# Patient Record
Sex: FEMALE | Race: WHITE | NOT HISPANIC OR LATINO | Employment: FULL TIME | URBAN - METROPOLITAN AREA
[De-identification: names, ages, dates, MRNs, and addresses within clinical notes are randomized per-mention and may not be internally consistent; named-entity substitution may affect disease eponyms.]

---

## 2023-05-29 ENCOUNTER — OUTSIDE PLACE OF SERVICE (OUTPATIENT)
Dept: GASTROENTEROLOGY | Facility: CLINIC | Age: 48
End: 2023-05-29
Payer: COMMERCIAL

## 2023-05-29 ENCOUNTER — OUTSIDE PLACE OF SERVICE (OUTPATIENT)
Dept: GASTROENTEROLOGY | Facility: CLINIC | Age: 48
End: 2023-05-29

## 2023-05-29 PROCEDURE — 43255 PR EGD, FLEX, W/CTRL BLEED, ANY METHOD: ICD-10-PCS | Mod: ,,, | Performed by: INTERNAL MEDICINE

## 2023-05-29 PROCEDURE — 99222 1ST HOSP IP/OBS MODERATE 55: CPT | Mod: 25,,, | Performed by: INTERNAL MEDICINE

## 2023-05-29 PROCEDURE — 99222 PR INITIAL HOSPITAL CARE,LEVL II: ICD-10-PCS | Mod: 25,,, | Performed by: INTERNAL MEDICINE

## 2023-05-29 PROCEDURE — 43255 EGD CONTROL BLEEDING ANY: CPT | Mod: ,,, | Performed by: INTERNAL MEDICINE

## 2023-05-30 ENCOUNTER — OUTSIDE PLACE OF SERVICE (OUTPATIENT)
Dept: GASTROENTEROLOGY | Facility: CLINIC | Age: 48
End: 2023-05-30
Payer: COMMERCIAL

## 2023-05-30 PROCEDURE — 43235 EGD DIAGNOSTIC BRUSH WASH: CPT | Mod: ,,, | Performed by: INTERNAL MEDICINE

## 2023-05-30 PROCEDURE — 43235 PR EGD, FLEX, DIAGNOSTIC: ICD-10-PCS | Mod: ,,, | Performed by: INTERNAL MEDICINE

## 2023-05-31 ENCOUNTER — TELEPHONE (OUTPATIENT)
Dept: GASTROENTEROLOGY | Facility: CLINIC | Age: 48
End: 2023-05-31
Payer: COMMERCIAL

## 2023-05-31 NOTE — TELEPHONE ENCOUNTER
----- Message from Lyly Chiang sent at 5/31/2023 10:22 AM CDT -----  Contact: paul from [facility]  Paul tamayo [facility] is needing a hospital f/u for pt. Please call pt st [phone] . Pt needs a 3-4 week f/u

## 2023-06-02 ENCOUNTER — TELEPHONE (OUTPATIENT)
Dept: GASTROENTEROLOGY | Facility: CLINIC | Age: 48
End: 2023-06-02
Payer: COMMERCIAL

## 2023-06-02 DIAGNOSIS — K28.4 GASTROINTESTINAL HEMORRHAGE ASSOCIATED WITH GASTROJEJUNAL ULCER: Primary | ICD-10-CM

## 2023-06-02 NOTE — PROGRESS NOTES
Clinic Note    Reason for visit:  The primary encounter diagnosis was Anastomotic ulcer S/P gastric bypass. Diagnoses of Anemia, unspecified type and Encounter for screening colonoscopy were also pertinent to this visit.    PCP: Nubia Vasquez       HPI:  This is a 47 y.o. female who was seen as inpatient on 5/29/2023 when she had acute onset of hematemesis. 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 She was evaluated in the [facility] ER and found to have hgb of 8.9 with normal MCV. Ferritin was 4.3. She had been taking Voltaren for a few weeks due to a broken left ankle. She has h/o gastric bypass and had multiple ulcers at the gastroenteric anastomosis. She states she has always been anemic. Denies any knowledge of iron deficiency. She is on pantoprazole 40mg BID. She has stopped Voltaren since hospital admission. Denies any heartburn/reflux, melena, N/V. Having BM. Denies rectal bleeding, weight loss, F/H CRC.     She does not recall most of the events from her hospitalization.  They were reviewed with her.  She is discontinued the nicotine portion of her vaping and working on discontinuing it completely.  She will avoid NSAIDs completely.    6/5/2023 H/H 9L/28L MCV 88 CMP WNL except Ca 8.3  Labs done 6/2/2023 and was told her hemoglobin was 8.6    She is planned for surgery with Dr. Guillory for her foot pending clearance from me.    EGD 5/30/2023: In proximal stomach there was small HH and what appeared to be 2 sutures near the gastric fundus vs cardia for unclear reason. Dilated gastric pouch and angulated gastroenteric anastomosis. Gastroenteric anastomotic ulcers more distal without active bleeding. Continue panto 40 BID for at least 2 months. Repeat EGD in few months.     EGD 5/29/2023:  Large amount of blood upon entering the gastric lumen.  Gastric pouch moderately to severely dilated.  Gastroenteric anastomotic ulcers with adherent clot that was removed with active bleeding s/p epinephrine injection and  single Endo Clip placement. Rec to keep patient intubated and sedated overnight and repeat EGD in the morning. Continue panto IV.    CT AP IV 5/29/2023: gastric bypass anatomy, mild splenomegaly at 13.1 cm, no GB, no uterus, air-fluid levels in small bowel, moderate stool from cecum to transverse colon, postop L4-L5 fusion.     Review of Systems   Constitutional:  Negative for fatigue, fever and unexpected weight change.   HENT:  Negative for mouth sores, postnasal drip, sore throat and trouble swallowing.    Eyes:  Negative for pain, discharge and eye dryness.   Respiratory:  Negative for apnea, cough, choking, chest tightness, shortness of breath and wheezing.    Cardiovascular:  Negative for chest pain, palpitations and leg swelling.   Gastrointestinal:  Negative for abdominal distention, abdominal pain, anal bleeding, blood in stool, change in bowel habit, constipation, diarrhea, nausea, rectal pain, vomiting, reflux, fecal incontinence and change in bowel habit.   Genitourinary:  Negative for bladder incontinence, dysuria and hematuria.   Musculoskeletal:  Negative for arthralgias, back pain and joint swelling.   Integumentary:  Negative for color change and rash.   Allergic/Immunologic: Negative for environmental allergies and food allergies.   Neurological:  Negative for seizures and headaches.   Hematological:  Negative for adenopathy. Does not bruise/bleed easily.      Past Medical History:   Diagnosis Date    Hypothyroidism, unspecified     Systemic lupus erythematosus, organ or system involvement unspecified      Past Surgical History:   Procedure Laterality Date    BACK SURGERY      CHOLECYSTECTOMY      GASTRIC BYPASS  2017    HYSTERECTOMY      THYROIDECTOMY       Family History   Problem Relation Age of Onset    No Known Problems Mother     No Known Problems Father     Breast cancer Maternal Grandmother      Social History     Tobacco Use    Smoking status: Never    Smokeless tobacco: Never   Substance  "Use Topics    Alcohol use: Yes     Comment: socially    Drug use: Never     Review of patient's allergies indicates:  No Known Allergies     Medication List with Changes/Refills   New Medications    SOD SULF-POT CHLORIDE-MAG SULF (SUTAB) 1.479-0.188- 0.225 GRAM TABLET    Take 12 tablets by mouth once daily. Take according to package instructions with indicated amount of water. No breakfast day before test. May substitute with Suprep, Clenpiq, Plenvu, Moviprep or GoLytely based on Rx plan and patient preference.   Current Medications    LEVOTHYROXINE (SYNTHROID) 200 MCG TABLET    Take 250 mcg by mouth before breakfast. Every other day    PANTOPRAZOLE (PROTONIX) 40 MG TABLET        ZOLPIDEM (AMBIEN) 10 MG TAB    Take 5 mg by mouth nightly as needed.        Vital Signs:  /67   Pulse 69   Ht 5' 9" (1.753 m)   Wt 63.5 kg (140 lb)   SpO2 99%   BMI 20.67 kg/m²         Physical Exam  Vitals reviewed.   Constitutional:       General: She is awake. She is not in acute distress.     Appearance: Normal appearance. She is well-developed. She is not ill-appearing, toxic-appearing or diaphoretic.   HENT:      Head: Normocephalic and atraumatic.      Nose: Nose normal.      Mouth/Throat:      Mouth: Mucous membranes are moist.      Pharynx: Oropharynx is clear. No oropharyngeal exudate or posterior oropharyngeal erythema.   Eyes:      General: Lids are normal. Gaze aligned appropriately. No scleral icterus.        Right eye: No discharge.         Left eye: No discharge.      Conjunctiva/sclera: Conjunctivae normal.   Neck:      Trachea: Trachea normal.   Cardiovascular:      Rate and Rhythm: Normal rate and regular rhythm.      Pulses:           Radial pulses are 2+ on the right side and 2+ on the left side.   Pulmonary:      Effort: Pulmonary effort is normal. No respiratory distress.      Breath sounds: No stridor. No wheezing.   Chest:      Chest wall: No tenderness.   Abdominal:      General: Bowel sounds are normal. " There is no distension.      Palpations: Abdomen is soft. There is no fluid wave, hepatomegaly or mass.      Tenderness: There is no abdominal tenderness. There is no guarding or rebound.   Musculoskeletal:         General: No tenderness or deformity.      Cervical back: Full passive range of motion without pain and neck supple. No tenderness.      Right lower leg: No edema.      Left lower leg: No edema.      Comments: Left foot boot   Lymphadenopathy:      Cervical: No cervical adenopathy.   Skin:     General: Skin is warm and dry.      Capillary Refill: Capillary refill takes less than 2 seconds.      Coloration: Skin is not cyanotic, jaundiced or pale.   Neurological:      General: No focal deficit present.      Mental Status: She is alert and oriented to person, place, and time.      Motor: No tremor.   Psychiatric:         Attention and Perception: Attention normal.         Mood and Affect: Mood and affect normal.         Speech: Speech normal.         Behavior: Behavior normal. Behavior is cooperative.          All of the data above and below has been reviewed by myself and any further interpretations will be reflected in the assessment and plan.   The data includes review of external notes, and independent interpretation of lab results, procedures, x-rays, and imaging reports.      Assessment:  Anastomotic ulcer S/P gastric bypass  -     Cancel: Ambulatory Referral to External Surgery  -     Ambulatory Referral to External Surgery    Anemia, unspecified type  -     Cancel: Ambulatory Referral to External Surgery  -     Ambulatory Referral to External Surgery    Encounter for screening colonoscopy  -     Cancel: Ambulatory Referral to External Surgery  -     Ambulatory Referral to External Surgery    Other orders  -     sod sulf-pot chloride-mag sulf (SUTAB) 1.479-0.188- 0.225 gram tablet; Take 12 tablets by mouth once daily. Take according to package instructions with indicated amount of water. No breakfast  day before test. May substitute with Suprep, Clenpiq, Plenvu, Moviprep or GoLytely based on Rx plan and patient preference.  Dispense: 24 tablet; Refill: 0    Anastomic ulcer- likely NSAID induced. Continue with pantoprazole 40mg BID for full 2 months and will repeat EGD for eval of ulcer  Anemia- due to blood loss/hematochezia from ulcer. She does have hx of chronic anemia but has not been evaluated for iron deficiency.  CRC screening- due for screening- will complete at 2 month reevaluation of ulcer.      The patient is requesting surgical clearance for her left ankle procedure with podiatry.  I recommend she postpone this podiatry procedure at least 2 weeks given her recent massive GI bleed.  If she remains asymptomatic at this 2 week arnol, then she may proceed from a GI standpoint for her podiatry procedure.  She will continue the pantoprazole twice a day until her next upper endoscopy.  She is asymptomatic from her current anemic state.  Although she told me she was not anemic previously she now reports her hemoglobin is around 10 at baseline.  She should avoid all nonsteroidal anti-inflammatory drugs and tobacco/nicotine products.       Recommendations:  Continue with pantoprazole 40mg twice daily.  Schedule for EGD/Colonoscopy.  Avoid all anti-inflammatories.  Continue to avoid nicotine/vaping.      Risks, benefits, and alternatives of medical management, any associated procedures, and/or treatment discussed with the patient. Patient given opportunity to ask questions and voices understanding. Patient has elected to proceed with the recommended care modalities as discussed.    Follow up in about 1 year (around 6/5/2024).    Order summary:  Orders Placed This Encounter   Procedures    Ambulatory Referral to External Surgery        Instructed patient to notify my office if they have not been contacted within two weeks after any procedures, submitting any samples (biopsies, blood, stool, urine, etc.) or after  any imaging (X-ray, CT, MRI, etc.).     Evon Matthews MD    This document may have been created using a voice recognition transcribing system. Incorrect words or phrases may have been missed during proofreading. Please interpret accordingly or contact me for clarification.

## 2023-06-05 ENCOUNTER — OFFICE VISIT (OUTPATIENT)
Dept: GASTROENTEROLOGY | Facility: CLINIC | Age: 48
End: 2023-06-05
Payer: COMMERCIAL

## 2023-06-05 VITALS
OXYGEN SATURATION: 99 % | HEART RATE: 69 BPM | HEIGHT: 69 IN | SYSTOLIC BLOOD PRESSURE: 103 MMHG | WEIGHT: 140 LBS | BODY MASS INDEX: 20.73 KG/M2 | DIASTOLIC BLOOD PRESSURE: 67 MMHG

## 2023-06-05 DIAGNOSIS — K28.9 ANASTOMOTIC ULCER S/P GASTRIC BYPASS: Primary | ICD-10-CM

## 2023-06-05 DIAGNOSIS — Z12.11 ENCOUNTER FOR SCREENING COLONOSCOPY: ICD-10-CM

## 2023-06-05 DIAGNOSIS — D64.9 ANEMIA, UNSPECIFIED TYPE: ICD-10-CM

## 2023-06-05 LAB
ABS NRBC COUNT: 0 X 10 3/UL (ref 0–0.01)
ABSOLUTE BASOPHIL: 0.04 X 10 3/UL (ref 0–0.22)
ABSOLUTE EOSINOPHIL: 0.15 X 10 3/UL (ref 0.04–0.54)
ABSOLUTE IMMATURE GRAN: 0.01 X 10 3/UL (ref 0–0.04)
ABSOLUTE LYMPHOCYTE: 1.22 X 10 3/UL (ref 0.86–4.75)
ABSOLUTE MONOCYTE: 0.27 X 10 3/UL (ref 0.22–1.08)
ALBUMIN SERPL-MCNC: 4.1 G/DL (ref 3.5–5.2)
ALBUMIN/GLOB SERPL ELPH: 2.1 {RATIO} (ref 1–2.7)
ALP ISOS SERPL LEV INH-CCNC: 99 U/L (ref 35–105)
ALT (SGPT): 14 U/L (ref 0–33)
ANION GAP SERPL CALC-SCNC: 11 MMOL/L (ref 8–17)
AST SERPL-CCNC: 20 U/L (ref 0–32)
BASOPHILS NFR BLD: 1.2 % (ref 0.2–1.2)
BILIRUBIN, TOTAL: 0.23 MG/DL (ref 0–1.2)
BUN/CREAT SERPL: 12.6 (ref 6–20)
CALCIUM SERPL-MCNC: 8.3 MG/DL (ref 8.6–10.2)
CARBON DIOXIDE, CO2: 23 MMOL/L (ref 22–29)
CHLORIDE: 102 MMOL/L (ref 98–107)
CREAT SERPL-MCNC: 0.73 MG/DL (ref 0.5–0.9)
EOSINOPHIL NFR BLD: 4.7 % (ref 0.7–7)
GFR ESTIMATION: 103.29 ML/MIN/1.73M2
GLOBULIN: 2 G/DL (ref 1.5–4.5)
GLUCOSE: 90 MG/DL (ref 74–106)
HCT VFR BLD AUTO: 28.2 % (ref 37–47)
HGB BLD-MCNC: 9 G/DL (ref 12–16)
IMMATURE GRANULOCYTES: 0.3 % (ref 0–0.5)
LYMPHOCYTES NFR BLD: 37.9 % (ref 19.3–53.1)
MCH RBC QN AUTO: 28.1 PG (ref 27–32)
MCHC RBC AUTO-ENTMCNC: 31.9 G/DL (ref 32–36)
MCV RBC AUTO: 88.1 FL (ref 82–100)
MONOCYTES NFR BLD: 8.4 % (ref 4.7–12.5)
NEUTROPHILS # BLD AUTO: 1.53 X 10 3/UL (ref 2.15–7.56)
NEUTROPHILS NFR BLD: 47.5 % (ref 34–71.1)
NUCLEATED RED BLOOD CELLS: 0 /100 WBC (ref 0–0.2)
PLATELET # BLD AUTO: 218 X 10 3/UL (ref 135–400)
POTASSIUM: 3.7 MMOL/L (ref 3.5–5.1)
PROT SNV-MCNC: 6.1 G/DL (ref 6.4–8.3)
RBC # BLD AUTO: 3.2 X 10 6/UL (ref 4.2–5.4)
RDW-SD: 46.6 FL (ref 37–54)
SODIUM: 136 MMOL/L (ref 136–145)
UREA NITROGEN (BUN): 9.2 MG/DL (ref 6–20)
WBC # BLD: 3.22 X 10 3/UL (ref 4.3–10.8)

## 2023-06-05 PROCEDURE — 3008F PR BODY MASS INDEX (BMI) DOCUMENTED: ICD-10-PCS | Mod: CPTII,S$GLB,, | Performed by: INTERNAL MEDICINE

## 2023-06-05 PROCEDURE — 99215 OFFICE O/P EST HI 40 MIN: CPT | Mod: S$GLB,,, | Performed by: INTERNAL MEDICINE

## 2023-06-05 PROCEDURE — 3074F PR MOST RECENT SYSTOLIC BLOOD PRESSURE < 130 MM HG: ICD-10-PCS | Mod: CPTII,S$GLB,, | Performed by: INTERNAL MEDICINE

## 2023-06-05 PROCEDURE — 3078F PR MOST RECENT DIASTOLIC BLOOD PRESSURE < 80 MM HG: ICD-10-PCS | Mod: CPTII,S$GLB,, | Performed by: INTERNAL MEDICINE

## 2023-06-05 PROCEDURE — 1159F MED LIST DOCD IN RCRD: CPT | Mod: CPTII,S$GLB,, | Performed by: INTERNAL MEDICINE

## 2023-06-05 PROCEDURE — 1160F PR REVIEW ALL MEDS BY PRESCRIBER/CLIN PHARMACIST DOCUMENTED: ICD-10-PCS | Mod: CPTII,S$GLB,, | Performed by: INTERNAL MEDICINE

## 2023-06-05 PROCEDURE — 99215 PR OFFICE/OUTPT VISIT, EST, LEVL V, 40-54 MIN: ICD-10-PCS | Mod: S$GLB,,, | Performed by: INTERNAL MEDICINE

## 2023-06-05 PROCEDURE — 3078F DIAST BP <80 MM HG: CPT | Mod: CPTII,S$GLB,, | Performed by: INTERNAL MEDICINE

## 2023-06-05 PROCEDURE — 3008F BODY MASS INDEX DOCD: CPT | Mod: CPTII,S$GLB,, | Performed by: INTERNAL MEDICINE

## 2023-06-05 PROCEDURE — 3074F SYST BP LT 130 MM HG: CPT | Mod: CPTII,S$GLB,, | Performed by: INTERNAL MEDICINE

## 2023-06-05 PROCEDURE — 1160F RVW MEDS BY RX/DR IN RCRD: CPT | Mod: CPTII,S$GLB,, | Performed by: INTERNAL MEDICINE

## 2023-06-05 PROCEDURE — 1159F PR MEDICATION LIST DOCUMENTED IN MEDICAL RECORD: ICD-10-PCS | Mod: CPTII,S$GLB,, | Performed by: INTERNAL MEDICINE

## 2023-06-05 RX ORDER — ZOLPIDEM TARTRATE 10 MG/1
5 TABLET ORAL NIGHTLY PRN
COMMUNITY
End: 2023-06-09 | Stop reason: SDUPTHER

## 2023-06-05 RX ORDER — SOD SULF/POT CHLORIDE/MAG SULF 1.479 G
12 TABLET ORAL DAILY
Qty: 24 TABLET | Refills: 0 | Status: SHIPPED | OUTPATIENT
Start: 2023-06-05

## 2023-06-05 RX ORDER — PANTOPRAZOLE SODIUM 40 MG/1
TABLET, DELAYED RELEASE ORAL
COMMUNITY
Start: 2023-05-31

## 2023-06-05 RX ORDER — LEVOTHYROXINE SODIUM 200 UG/1
250 TABLET ORAL
COMMUNITY

## 2023-06-05 NOTE — PATIENT INSTRUCTIONS
Continue with pantoprazole 40mg twice daily.  Schedule for EGD/Colonoscopy.  Avoid all anti-inflammatories.  Continue to avoid nicotine/vaping.    Please notify my office if you have not been contacted within two weeks after any procedures, submitting any samples (biopsies, blood, stool, urine, etc.) or after any imaging (X-ray, CT, MRI, etc.).

## 2023-06-05 NOTE — LETTER
June 5, 2023        Nubia Vasquez MD  [address]  [address]  [place] [address]             [facility]  [address] [address]  Phone: [phone]  Fax: [phone]   Patient: Giovanna Arthur   MR Number: [id number]   YOB: 1975   Date of Visit: 6/5/2023       Dear Dr. Vasquez:    Thank you for referring Giovanna Arthur to me for evaluation. Attached you will find relevant portions of my assessment and plan of care.    If you have questions, please do not hesitate to call me. I look forward to following Giovanna Arthur along with you.    Sincerely,      MD WILSON Wolf DPM [facility]

## 2023-06-05 NOTE — TELEPHONE ENCOUNTER
----- Message from Aimee Mohr LPN sent at 6/1/2023  4:16 PM CDT -----  Regarding: FW: Surgical Clearance  Contact: patient    ----- Message -----  From: Humaira Batista  Sent: 6/1/2023   3:05 PM CDT  To: Byron SORIA Staff  Subject: Surgical Clearance                               Per phone call with patient she is needing to have a surgical clearance to be sent to Dr Guillory, Podiatry because she is having foot surgery and he wants to make sure that everything is ok.  Please return call at [phone] (home).    Thanks,  SJ        
I spoke with pt and she stated that she will have labs drawn before her OV today.    
LVM asking pt to call our office.    
The patient has an OV with me Monday afternoon. Ask her to have blood drawn before that OV. Orders signed.  CARMELITA  
The patient will likely need OV before clearance can be given. Dr. Matthews, when would you like patient to come in for OV?   MLC  
Self

## 2023-06-08 ENCOUNTER — OFFICE VISIT (OUTPATIENT)
Dept: PRIMARY CARE CLINIC | Facility: CLINIC | Age: 48
End: 2023-06-08
Payer: COMMERCIAL

## 2023-06-08 ENCOUNTER — PATIENT MESSAGE (OUTPATIENT)
Dept: PRIMARY CARE CLINIC | Facility: CLINIC | Age: 48
End: 2023-06-08

## 2023-06-08 VITALS — OXYGEN SATURATION: 99 % | SYSTOLIC BLOOD PRESSURE: 104 MMHG | DIASTOLIC BLOOD PRESSURE: 69 MMHG

## 2023-06-08 DIAGNOSIS — Z98.84 H/O BARIATRIC SURGERY: ICD-10-CM

## 2023-06-08 DIAGNOSIS — Z12.39 ENCOUNTER FOR SCREENING FOR MALIGNANT NEOPLASM OF BREAST, UNSPECIFIED SCREENING MODALITY: ICD-10-CM

## 2023-06-08 DIAGNOSIS — E03.9 HYPOTHYROIDISM, UNSPECIFIED TYPE: ICD-10-CM

## 2023-06-08 DIAGNOSIS — D64.9 ANEMIA, UNSPECIFIED TYPE: Primary | ICD-10-CM

## 2023-06-08 DIAGNOSIS — G47.00 INSOMNIA, UNSPECIFIED TYPE: ICD-10-CM

## 2023-06-08 PROCEDURE — 1159F PR MEDICATION LIST DOCUMENTED IN MEDICAL RECORD: ICD-10-PCS | Mod: CPTII,S$GLB,, | Performed by: INTERNAL MEDICINE

## 2023-06-08 PROCEDURE — 3074F PR MOST RECENT SYSTOLIC BLOOD PRESSURE < 130 MM HG: ICD-10-PCS | Mod: CPTII,S$GLB,, | Performed by: INTERNAL MEDICINE

## 2023-06-08 PROCEDURE — 3078F DIAST BP <80 MM HG: CPT | Mod: CPTII,S$GLB,, | Performed by: INTERNAL MEDICINE

## 2023-06-08 PROCEDURE — 3078F PR MOST RECENT DIASTOLIC BLOOD PRESSURE < 80 MM HG: ICD-10-PCS | Mod: CPTII,S$GLB,, | Performed by: INTERNAL MEDICINE

## 2023-06-08 PROCEDURE — 1159F MED LIST DOCD IN RCRD: CPT | Mod: CPTII,S$GLB,, | Performed by: INTERNAL MEDICINE

## 2023-06-08 PROCEDURE — 99204 OFFICE O/P NEW MOD 45 MIN: CPT | Mod: S$GLB,,, | Performed by: INTERNAL MEDICINE

## 2023-06-08 PROCEDURE — 3074F SYST BP LT 130 MM HG: CPT | Mod: CPTII,S$GLB,, | Performed by: INTERNAL MEDICINE

## 2023-06-08 PROCEDURE — 99204 PR OFFICE/OUTPT VISIT, NEW, LEVL IV, 45-59 MIN: ICD-10-PCS | Mod: S$GLB,,, | Performed by: INTERNAL MEDICINE

## 2023-06-08 NOTE — PROGRESS NOTES
Subjective:      Patient ID: Giovanna Arthur is a 47 y.o. female.    Chief Complaint: Hospital Follow Up (2 nights at [facility] 05/29/23 upper GI bleed. ) and Pre-op Exam (Dr Sam- ankle revision on the right. He has done all the work up but the GI bleed halted the 1st sx date. )    HPI    Past Medical History:   Diagnosis Date    Hypothyroidism, unspecified     Systemic lupus erythematosus, organ or system involvement unspecified        Past Surgical History:   Procedure Laterality Date    BACK SURGERY      CHOLECYSTECTOMY      GASTRIC BYPASS  2017    HYSTERECTOMY      THYROIDECTOMY          This is a 47 y.o. female who was seen as inpatient on 5/29/2023 when she had acute onset of hematemesis.    She had been taking Voltaren for a few weeks due to a broken left ankle. She has h/o gastric bypass and had multiple ulcers at the gastroenteric anastomosis. She states she has always been anemic.      6/5/2023 H/H 9L/28L MCV 88 CMP WNL except Ca 8.3  Labs done 6/2/2023 and was told her hemoglobin was 8.6     She is planned for surgery with Dr. Guillory for her foot pending clearance from Dr Matthews     EGD 5/30/2023: In proximal stomach there was small HH and what appeared to be 2 sutures near the gastric fundus vs cardia for unclear reason. Dilated gastric pouch and angulated gastroenteric anastomosis. Gastroenteric anastomotic ulcers more distal without active bleeding. Continue panto 40 BID for at least 2 months. Repeat EGD in few months.     She also states she was diagnosed with lupus, has seen Dr Rachel a year ago, was told he wasn't seeing anything autoimmune. Was on plaquenil previously as well as lyrica and a muscle relaxer. She is only on levothyroxine and states she gets her ambien from [place]    Review of Systems   Constitutional:  Negative for chills and fever.   HENT:  Negative for hearing loss.    Eyes:  Negative for blurred vision.   Respiratory:  Negative for cough, shortness of breath and wheezing.   "  Cardiovascular:  Negative for chest pain, palpitations and leg swelling.   Gastrointestinal:  Negative for abdominal pain, blood in stool, constipation, diarrhea, melena, nausea and vomiting.   Genitourinary:  Negative for dysuria, frequency and urgency.   Musculoskeletal:  Negative for falls.   Skin:  Negative for rash.   Neurological:  Negative for dizziness and headaches.   Endo/Heme/Allergies:  Does not bruise/bleed easily.   Psychiatric/Behavioral:  Negative for depression. The patient is not nervous/anxious.    Objective:     Physical Exam  Vitals reviewed.   Constitutional:       Appearance: Normal appearance.   HENT:      Head: Normocephalic.      Mouth/Throat:      Mouth: Mucous membranes are moist.      Pharynx: Oropharynx is clear.   Eyes:      Extraocular Movements: Extraocular movements intact.      Conjunctiva/sclera: Conjunctivae normal.      Pupils: Pupils are equal, round, and reactive to light.   Cardiovascular:      Rate and Rhythm: Normal rate and regular rhythm.   Pulmonary:      Effort: Pulmonary effort is normal.      Breath sounds: Normal breath sounds.   Abdominal:      General: Bowel sounds are normal.   Musculoskeletal:      Right lower leg: No edema.      Left lower leg: No edema.   Skin:     General: Skin is warm.      Capillary Refill: Capillary refill takes less than 2 seconds.   Neurological:      General: No focal deficit present.      Mental Status: She is alert.   Psychiatric:         Mood and Affect: Mood normal.     /69 (BP Location: Left arm, Patient Position: Sitting, BP Method: Medium (Automatic))   Pulse (P) 71   Ht (P) 5' 9" (1.753 m)   Wt (P) 63.5 kg (140 lb)   SpO2 99%   BMI (P) 20.67 kg/m²     Assessment:       ICD-10-CM ICD-9-CM   1. Anemia, unspecified type  D64.9 285.9   2. Hypothyroidism, unspecified type  E03.9 244.9   3. Encounter for screening for malignant neoplasm of breast, unspecified screening modality  Z12.39 V76.10   4. H/O bariatric surgery  " Z98.84 V45.86   5. Insomnia, unspecified type  G47.00 780.52       Plan:     Medication List with Changes/Refills   New Medications    FERROUS SULFATE (IRON) 325 MG (65 MG IRON) TAB TABLET    Take 1 tablet (325 mg total) by mouth 3 (three) times daily.   Current Medications    LEVOTHYROXINE (SYNTHROID) 200 MCG TABLET    Take 250 mcg by mouth before breakfast. Every other day    PANTOPRAZOLE (PROTONIX) 40 MG TABLET        SOD SULF-POT CHLORIDE-MAG SULF (SUTAB) 1.479-0.188- 0.225 GRAM TABLET    Take 12 tablets by mouth once daily. Take according to package instructions with indicated amount of water. No breakfast day before test. May substitute with Suprep, Clenpiq, Plenvu, Moviprep or GoLytely based on Rx plan and patient preference.   Changed and/or Refilled Medications    Modified Medication Previous Medication    ZOLPIDEM (AMBIEN) 10 MG TAB zolpidem (AMBIEN) 10 mg Tab       Take 1 tablet (10 mg total) by mouth nightly as needed (insomnia).    Take 5 mg by mouth nightly as needed.        1. Anemia, unspecified type  -     Iron, TIBC and Ferritin Panel; Future; Expected date: 06/08/2023  -     ferrous sulfate (IRON) 325 mg (65 mg iron) Tab tablet; Take 1 tablet (325 mg total) by mouth 3 (three) times daily.  Dispense: 90 tablet; Refill: 3    2. Hypothyroidism, unspecified type  -     TSH; Future; Expected date: 06/08/2023  -     T4, Free; Future; Expected date: 06/08/2023    3. Encounter for screening for malignant neoplasm of breast, unspecified screening modality  -     Mammo Digital Screening Bilat; Future; Expected date: 06/08/2023    4. H/O bariatric surgery  -     Vitamin B12; Future; Expected date: 06/08/2023    5. Insomnia, unspecified type  -     zolpidem (AMBIEN) 10 mg Tab; Take 1 tablet (10 mg total) by mouth nightly as needed (insomnia).  Dispense: 30 tablet; Refill: 0    Other orders  -     TSH+Free T4         Per Dr Matthews note patient can have surgery if asymptomatic for about 2 weeks. Last hgb was 9.  She states she has not had iron labs done before and needs thyroid labs as well    Future Appointments   Date Time Provider Department Center   8/29/2023  6:30 AM [facility]  [place]   12/8/2023  8:20 AM Nubai Vasquez MD [facility]  [place]   6/5/2024 12:15 PM Evon Matthews MD [facility]  [place]     Patient is low risk for low risk surgery

## 2023-06-09 ENCOUNTER — TELEPHONE (OUTPATIENT)
Dept: PRIMARY CARE CLINIC | Facility: CLINIC | Age: 48
End: 2023-06-09
Payer: COMMERCIAL

## 2023-06-09 ENCOUNTER — TELEPHONE (OUTPATIENT)
Dept: GASTROENTEROLOGY | Facility: CLINIC | Age: 48
End: 2023-06-09

## 2023-06-09 ENCOUNTER — PATIENT MESSAGE (OUTPATIENT)
Dept: PRIMARY CARE CLINIC | Facility: CLINIC | Age: 48
End: 2023-06-09
Payer: COMMERCIAL

## 2023-06-09 LAB
% SATURATION: 3 % (ref 20–50)
FERRITIN SERPL-MCNC: 2.7 NG/ML (ref 8–252)
IRON: 11 UG/DL (ref 50–170)
T4 FREE SP9 P CHAL SERPL-SCNC: 0.41 NG/DL (ref 0.76–1.46)
TOTAL IRON BINDING CAPACITY: 367 UG/DL (ref 250–450)
TSH SERPL DL<=0.005 MIU/L-ACNC: 6.09 UIU/ML (ref 0.36–3.74)
VITAMIN B12: 945 PG/ML (ref 193–986)

## 2023-06-09 RX ORDER — ZOLPIDEM TARTRATE 10 MG/1
10 TABLET ORAL NIGHTLY PRN
Qty: 30 TABLET | Refills: 0 | Status: SHIPPED | OUTPATIENT
Start: 2023-06-09

## 2023-06-09 NOTE — TELEPHONE ENCOUNTER
----- Message from Humaira Batista sent at 6/9/2023  3:47 PM CDT -----  Regarding: Lab Reports  Contact: Dr Kahlil Covington  Per phone call with Nadya, she is calling from [facility] and she is needing the lab notes from 06/05/2023 and she do not have a copy of the lab and anesthesia is requiring a copy for the surgery.  Please return call at [phone] and fax's [phone].  Her surgery date is Rowena 15,2023 and Anesthesia needs it by the 13th of June to review it.    Thanks,  SJ

## 2023-06-09 NOTE — TELEPHONE ENCOUNTER
----- Message from Sherly Villarreal sent at 6/9/2023  8:36 AM CDT -----  Contact: Zachary ([facility])  Nadya called to consult with nurse or staff regarding a surgery clearance. She states they are needing the HNP, heart and lung assessment and office notes faxed over to them. Nadya left a contact number for the office, [phone] and also a fax number of [phone]. Thanks/MR

## 2023-06-12 RX ORDER — FERROUS SULFATE 325(65) MG
325 TABLET ORAL 3 TIMES DAILY
Qty: 90 TABLET | Refills: 3 | Status: SHIPPED | OUTPATIENT
Start: 2023-06-12

## 2023-06-13 ENCOUNTER — PATIENT MESSAGE (OUTPATIENT)
Dept: PRIMARY CARE CLINIC | Facility: CLINIC | Age: 48
End: 2023-06-13
Payer: COMMERCIAL

## 2023-06-13 ENCOUNTER — TELEPHONE (OUTPATIENT)
Dept: PRIMARY CARE CLINIC | Facility: CLINIC | Age: 48
End: 2023-06-13
Payer: COMMERCIAL

## 2023-06-13 NOTE — TELEPHONE ENCOUNTER
MULTIPLE MESSAGES. I was out on yesterday. Everything has been completed this am  and faxed. Pt notified through her [facility].     ----- Message from Hortencia Burris LPN sent at 6/12/2023  4:38 PM CDT -----  Regarding: FW: Surgical Clearance  Contact: patient  Did you receive a surgical clearance letter for this patient?     ----- Message -----  From: Humaira Batista  Sent: 6/12/2023   2:27 PM CDT  To: Pedro Delacruz Staff  Subject: Surgical Clearance                               Per phone call with patient, she stated that she is schedule to have surgery on Thursday 06/06/15/2023 with a podiatrist and she is needing the surgical clearance to be faxed to Dr Guillory and the office has not received it.  There office faxed over information twice.  Please return call at [phone] (home).    Thanks,  ELKE

## 2023-06-14 ENCOUNTER — PATIENT MESSAGE (OUTPATIENT)
Dept: PRIMARY CARE CLINIC | Facility: CLINIC | Age: 48
End: 2023-06-14
Payer: COMMERCIAL

## 2023-08-24 ENCOUNTER — TELEPHONE (OUTPATIENT)
Dept: GASTROENTEROLOGY | Facility: CLINIC | Age: 48
End: 2023-08-24
Payer: COMMERCIAL

## 2023-08-24 VITALS — HEIGHT: 69 IN | WEIGHT: 140 LBS | BODY MASS INDEX: 20.73 KG/M2

## 2023-08-24 DIAGNOSIS — D64.9 ANEMIA, UNSPECIFIED TYPE: ICD-10-CM

## 2023-08-24 DIAGNOSIS — Z12.11 ENCOUNTER FOR SCREENING COLONOSCOPY: ICD-10-CM

## 2023-08-24 DIAGNOSIS — K28.9 ANASTOMOTIC ULCER S/P GASTRIC BYPASS: Primary | ICD-10-CM

## 2023-08-24 NOTE — TELEPHONE ENCOUNTER
"[facility]  [address] [address]  Phone: [phone]  Fax: [phone]    History & Physical         Provider: Dr. Evon Matthews    Patient Name: Giovanna ARIAS (age):1975  47 y.o.           Gender: female   Phone: [phone]     Referring Physician: Nubia Vasquez     Vital Signs:   Height - 5' 9"  Weight - 140 lb  BMI -  20.67    Plan: EGD/Colonoscopy @ [facility]    Encounter Diagnoses   Name Primary?    Anastomotic ulcer S/P gastric bypass Yes    Anemia, unspecified type     Encounter for screening colonoscopy            History:      Past Medical History:   Diagnosis Date    Hypothyroidism, unspecified     Systemic lupus erythematosus, organ or system involvement unspecified       Past Surgical History:   Procedure Laterality Date    BACK SURGERY      CHOLECYSTECTOMY      GASTRIC BYPASS  2017    HYSTERECTOMY      THYROIDECTOMY        Medication List with Changes/Refills   Current Medications    FERROUS SULFATE (IRON) 325 MG (65 MG IRON) TAB TABLET    Take 1 tablet (325 mg total) by mouth 3 (three) times daily.    LEVOTHYROXINE (SYNTHROID) 200 MCG TABLET    Take 250 mcg by mouth before breakfast. Every other day    PANTOPRAZOLE (PROTONIX) 40 MG TABLET        SOD SULF-POT CHLORIDE-MAG SULF (SUTAB) 1.479-0.188- 0.225 GRAM TABLET    Take 12 tablets by mouth once daily. Take according to package instructions with indicated amount of water. No breakfast day before test. May substitute with Suprep, Clenpiq, Plenvu, Moviprep or GoLytely based on Rx plan and patient preference.    ZOLPIDEM (AMBIEN) 10 MG TAB    Take 1 tablet (10 mg total) by mouth nightly as needed (insomnia).      Review of patient's allergies indicates:  No Known Allergies   Family History   Problem Relation Age of Onset    No Known Problems Mother     No Known Problems Father     Breast cancer Maternal Grandmother     "   Social History     Tobacco Use    Smoking status: Never    Smokeless tobacco: Never   Substance Use Topics    Alcohol use: Yes     Comment: socially    Drug use: Never        Physical Examination:     General Appearance:___________________________  HEENT: _____________________________________  Abdomen:____________________________________  Heart:________________________________________  Lungs:_______________________________________  Extremities:___________________________________  Skin:_________________________________________  Endocrine:____________________________________  Genitourinary:_________________________________  Neurological:__________________________________      Patient has been evaluated immediately prior to sedation and is medically cleared for endoscopy with IVCS as an ASA class: ______      Physician Signature: _________________________       Date: ________  Time: ________

## 2023-08-28 ENCOUNTER — TELEPHONE (OUTPATIENT)
Dept: GASTROENTEROLOGY | Facility: CLINIC | Age: 48
End: 2023-08-28
Payer: COMMERCIAL

## 2023-08-28 NOTE — TELEPHONE ENCOUNTER
Returned callers call and she states she didn't have a ride and then when I let her know the r/s date she states she will make a way leave on schedule

## 2023-08-28 NOTE — TELEPHONE ENCOUNTER
----- Message from Nasreen Keenan sent at 8/28/2023 12:27 PM CDT -----  Contact: Pt  Has a procedure scheduled for tomorrow but have no transportation and needs to resched and can be reached at [phone]//thanks/dbw

## 2023-08-29 ENCOUNTER — OUTSIDE PLACE OF SERVICE (OUTPATIENT)
Dept: GASTROENTEROLOGY | Facility: CLINIC | Age: 48
End: 2023-08-29

## 2023-08-29 LAB — CRC RECOMMENDATION EXT: NORMAL

## 2023-08-29 PROCEDURE — 43239 EGD BIOPSY SINGLE/MULTIPLE: CPT | Mod: ,,, | Performed by: INTERNAL MEDICINE

## 2023-08-29 PROCEDURE — 45385 PR COLONOSCOPY,REMV LESN,SNARE: ICD-10-PCS | Mod: 33,,, | Performed by: INTERNAL MEDICINE

## 2023-08-29 PROCEDURE — 45385 COLONOSCOPY W/LESION REMOVAL: CPT | Mod: 33,,, | Performed by: INTERNAL MEDICINE

## 2023-08-29 PROCEDURE — 43239 PR EGD, FLEX, W/BIOPSY, SGL/MULTI: ICD-10-PCS | Mod: ,,, | Performed by: INTERNAL MEDICINE

## 2023-08-30 LAB — SPECIMEN TO PATHOLOGY: NORMAL

## 2023-09-04 ENCOUNTER — TELEPHONE (OUTPATIENT)
Dept: GASTROENTEROLOGY | Facility: CLINIC | Age: 48
End: 2023-09-04
Payer: COMMERCIAL

## 2023-09-05 NOTE — TELEPHONE ENCOUNTER
SBBx nl, gastric pouch Bx react w/o Hp, 1 TA, repeat colonoscopy in 5 years.     Notify patient. No signs of infection, precancerous cells or Celiac disease on the upper endoscopy biopsies.  Her colon polyp was benign. Repeat colonoscopy in 5 years.  Update in Health Maintenance section of Epic. Her iron studies from 6/2023 were still low.  I recommend she have iron infusion (two infusions) given her poor iron absorption due to her gastric bypass.  We can order if she agrees to the infusions.  She is also to decrease her panto 40 from BID to once daily.  NBP

## 2023-09-06 ENCOUNTER — TELEPHONE (OUTPATIENT)
Dept: GASTROENTEROLOGY | Facility: CLINIC | Age: 48
End: 2023-09-06
Payer: COMMERCIAL

## 2023-09-06 DIAGNOSIS — D50.9 IRON DEFICIENCY ANEMIA, UNSPECIFIED IRON DEFICIENCY ANEMIA TYPE: ICD-10-CM

## 2023-09-06 NOTE — TELEPHONE ENCOUNTER
Pt informed of results, states she would like for you to order the 2 iron infusions as suggested.  Informed pt we will send the orders and the infusion center will contact her with the date and time . Patient verbalized understanding to call the office if she does not hear from them within 2 weeks.

## 2023-09-08 PROBLEM — D50.9 IRON DEFICIENCY ANEMIA: Status: ACTIVE | Noted: 2023-09-08

## 2023-09-08 RX ORDER — HEPARIN 100 UNIT/ML
500 SYRINGE INTRAVENOUS
Status: CANCELLED | OUTPATIENT
Start: 2023-09-11

## 2023-09-08 RX ORDER — DIPHENHYDRAMINE HYDROCHLORIDE 50 MG/ML
50 INJECTION INTRAMUSCULAR; INTRAVENOUS ONCE AS NEEDED
Status: CANCELLED | OUTPATIENT
Start: 2023-09-11

## 2023-09-08 RX ORDER — EPINEPHRINE 0.3 MG/.3ML
0.3 INJECTION SUBCUTANEOUS ONCE AS NEEDED
Status: CANCELLED | OUTPATIENT
Start: 2023-09-11

## 2023-09-08 RX ORDER — SODIUM CHLORIDE 0.9 % (FLUSH) 0.9 %
10 SYRINGE (ML) INJECTION
Status: CANCELLED | OUTPATIENT
Start: 2023-09-11

## 2023-09-08 NOTE — TELEPHONE ENCOUNTER
Therapy plan created for feraheme. Will send paper order to infusion center once we receive approval. Reminder set.  KN

## 2023-09-12 ENCOUNTER — TELEPHONE (OUTPATIENT)
Dept: GASTROENTEROLOGY | Facility: CLINIC | Age: 48
End: 2023-09-12
Payer: COMMERCIAL

## 2023-09-12 DIAGNOSIS — D50.9 IRON DEFICIENCY ANEMIA, UNSPECIFIED IRON DEFICIENCY ANEMIA TYPE: Primary | ICD-10-CM

## 2023-09-12 NOTE — TELEPHONE ENCOUNTER
Received this message from JORI Rollins    per [facility] step therapy requirements for iron infusions, the patient has to try and fail the preferred Venofer prior to approving the non-preferred Feraheme.    Patient notified that they will receive 5 doses instead of 2 due to insurance restrictions. The infusion will also be done with [facility] care. Prescription for Venofer 200mg IVP x 5 days over 14 days printed.  IDA

## 2023-09-21 LAB — BCS RECOMMENDATION EXT: NORMAL

## 2023-09-25 ENCOUNTER — TELEPHONE (OUTPATIENT)
Dept: GASTROENTEROLOGY | Facility: CLINIC | Age: 48
End: 2023-09-25
Payer: COMMERCIAL

## 2023-09-25 DIAGNOSIS — D50.9 IRON DEFICIENCY ANEMIA, UNSPECIFIED IRON DEFICIENCY ANEMIA TYPE: Primary | ICD-10-CM

## 2023-09-25 NOTE — TELEPHONE ENCOUNTER
IV venofer at [facility] care denied- not medically necessary due to no h/o of kidney disease.    Called [employer] which they stated appeal had to be done in writing. Appeal faxed to [phone] stating patient with FARIHA despite oral iron. IV iron medically necessary due to h/o gastric bypass.   KN

## 2023-09-26 ENCOUNTER — DOCUMENTATION ONLY (OUTPATIENT)
Dept: GASTROENTEROLOGY | Facility: CLINIC | Age: 48
End: 2023-09-26
Payer: COMMERCIAL

## 2023-10-02 ENCOUNTER — TELEPHONE (OUTPATIENT)
Dept: GASTROENTEROLOGY | Facility: CLINIC | Age: 48
End: 2023-10-02
Payer: COMMERCIAL

## 2023-10-02 NOTE — TELEPHONE ENCOUNTER
----- Message from Claudia Beach sent at 10/2/2023 10:02 AM CDT -----  Contact: dari  [email or web address]  requesting cb/intake

## 2023-10-10 ENCOUNTER — PATIENT OUTREACH (OUTPATIENT)
Dept: ADMINISTRATIVE | Facility: HOSPITAL | Age: 48
End: 2023-10-10
Payer: COMMERCIAL

## 2023-10-10 ENCOUNTER — PATIENT MESSAGE (OUTPATIENT)
Dept: PRIMARY CARE CLINIC | Facility: CLINIC | Age: 48
End: 2023-10-10
Payer: COMMERCIAL

## 2023-10-17 NOTE — TELEPHONE ENCOUNTER
Peer to peer done. Spoke with Dr. Machado. States venofer not recommended unless CKD. Feraheme would be preferred. Explained that we tried Feraheme and per [employer]: Venofer is preferred and is on formulary. He is calling pharmacy benefits to see if we can get feraheme approved. Will call back this evening or early in the AM.  KN

## 2023-10-20 RX ORDER — FERRIC CARBOXYMALTOSE 50 MG/ML
750 INJECTION, SOLUTION INTRAVENOUS WEEKLY
Qty: 15 ML | Refills: 1 | Status: SHIPPED | OUTPATIENT
Start: 2023-10-20 | End: 2023-10-20 | Stop reason: SDUPTHER

## 2023-10-20 NOTE — TELEPHONE ENCOUNTER
Per Dr. Machado. John is able to have INFED, Injectafer, or Monoferric. Called [facility] and confirmed that they have Injectafer.     Inject Injectefar 750mg IV x 2 dose >7 days apart prescription signed and printed. Patient was aware we were working on infusion with insurance. Please notify patient send to [facility].     IDA.

## 2023-11-01 ENCOUNTER — TELEPHONE (OUTPATIENT)
Dept: GASTROENTEROLOGY | Facility: CLINIC | Age: 48
End: 2023-11-01
Payer: COMMERCIAL

## 2023-11-01 NOTE — TELEPHONE ENCOUNTER
----- Message from Mary Bee LPN sent at 10/31/2023  3:35 PM CDT -----  I spoke with Vonnie @ [facility] @ [phone]. Vonnie informed me that [facility] is continuing to deny pt's iron infusion. She wants to know if there is something else you would like to do or if you have additional documentation to provide to them.  ----- Message -----  From: Natalee Campbell  Sent: 10/31/2023  10:52 AM CDT  To: Ryan Ruiz Staff    Vonnie w/[facility]ulce [facility] calling about iron infusion being denied again and they needs additional documentation.  They can be reached at [phone] and fax number [phone].    Thanks,

## 2023-11-03 ENCOUNTER — TELEPHONE (OUTPATIENT)
Dept: GASTROENTEROLOGY | Facility: CLINIC | Age: 48
End: 2023-11-03
Payer: COMMERCIAL

## 2023-11-03 NOTE — TELEPHONE ENCOUNTER
----- Message from Mary Bee LPN sent at 10/31/2023  3:35 PM CDT -----  I spoke with Vonnie @ [facility] @ [phone]. Vonnie informed me that [facility] is continuing to deny pt's iron infusion. She wants to know if there is something else you would like to do or if you have additional documentation to provide to them.  ----- Message -----  From: Natalee Campbell  Sent: 10/31/2023  10:52 AM CDT  To: Ryan Ruiz Staff    Vonnie w/dulce [facility] calling about iron infusion being denied again and they needs additional documentation.  They can be reached at [phone] and fax number [phone].    Thanks,

## 2023-11-03 NOTE — TELEPHONE ENCOUNTER
Spoke with Vonnie at [facility]. She ran it through again injectafer was approved. Calling the patient right now to schedule.  IDA

## 2023-11-07 ENCOUNTER — OFFICE VISIT (OUTPATIENT)
Dept: URGENT CARE | Facility: CLINIC | Age: 48
End: 2023-11-07
Payer: COMMERCIAL

## 2023-11-07 VITALS
SYSTOLIC BLOOD PRESSURE: 108 MMHG | WEIGHT: 150 LBS | HEART RATE: 77 BPM | HEIGHT: 69 IN | BODY MASS INDEX: 22.22 KG/M2 | OXYGEN SATURATION: 98 % | DIASTOLIC BLOOD PRESSURE: 75 MMHG | RESPIRATION RATE: 16 BRPM | TEMPERATURE: 99 F

## 2023-11-07 DIAGNOSIS — J02.9 SORE THROAT: ICD-10-CM

## 2023-11-07 DIAGNOSIS — R09.81 NASAL CONGESTION: ICD-10-CM

## 2023-11-07 DIAGNOSIS — J10.1 INFLUENZA A: Primary | ICD-10-CM

## 2023-11-07 DIAGNOSIS — H66.002 LEFT ACUTE SUPPURATIVE OTITIS MEDIA: ICD-10-CM

## 2023-11-07 DIAGNOSIS — R50.9 FEVER, UNSPECIFIED FEVER CAUSE: ICD-10-CM

## 2023-11-07 DIAGNOSIS — R52 BODY ACHES: ICD-10-CM

## 2023-11-07 LAB
CTP QC/QA: YES
CTP QC/QA: YES
POC MOLECULAR INFLUENZA A AGN: POSITIVE
POC MOLECULAR INFLUENZA B AGN: NEGATIVE
SARS-COV-2 AG RESP QL IA.RAPID: NEGATIVE

## 2023-11-07 PROCEDURE — 99214 PR OFFICE/OUTPT VISIT, EST, LEVL IV, 30-39 MIN: ICD-10-PCS | Mod: S$GLB,,, | Performed by: NURSE PRACTITIONER

## 2023-11-07 PROCEDURE — 87502 INFLUENZA DNA AMP PROBE: CPT | Mod: QW,,, | Performed by: NURSE PRACTITIONER

## 2023-11-07 PROCEDURE — 99214 OFFICE O/P EST MOD 30 MIN: CPT | Mod: S$GLB,,, | Performed by: NURSE PRACTITIONER

## 2023-11-07 PROCEDURE — 87502 POCT INFLUENZA A/B MOLECULAR: ICD-10-PCS | Mod: QW,,, | Performed by: NURSE PRACTITIONER

## 2023-11-07 PROCEDURE — 87811 SARS CORONAVIRUS 2 ANTIGEN POCT, MANUAL READ: ICD-10-PCS | Mod: QW,S$GLB,, | Performed by: NURSE PRACTITIONER

## 2023-11-07 PROCEDURE — 87811 SARS-COV-2 COVID19 W/OPTIC: CPT | Mod: QW,S$GLB,, | Performed by: NURSE PRACTITIONER

## 2023-11-07 RX ORDER — FLUTICASONE PROPIONATE 50 MCG
1 SPRAY, SUSPENSION (ML) NASAL DAILY
Qty: 9.9 ML | Refills: 0 | Status: SHIPPED | OUTPATIENT
Start: 2023-11-07

## 2023-11-07 RX ORDER — AZELASTINE 1 MG/ML
1 SPRAY, METERED NASAL 2 TIMES DAILY
Qty: 30 ML | Refills: 0 | Status: SHIPPED | OUTPATIENT
Start: 2023-11-07 | End: 2024-11-06

## 2023-11-07 RX ORDER — OSELTAMIVIR PHOSPHATE 75 MG/1
75 CAPSULE ORAL 2 TIMES DAILY
Qty: 10 CAPSULE | Refills: 0 | Status: SHIPPED | OUTPATIENT
Start: 2023-11-07 | End: 2023-11-12

## 2023-11-07 RX ORDER — BROMPHENIRAMINE MALEATE, PSEUDOEPHEDRINE HYDROCHLORIDE, AND DEXTROMETHORPHAN HYDROBROMIDE 2; 30; 10 MG/5ML; MG/5ML; MG/5ML
10 SYRUP ORAL EVERY 4 HOURS PRN
Qty: 120 ML | Refills: 0 | Status: SHIPPED | OUTPATIENT
Start: 2023-11-07

## 2023-11-07 RX ORDER — AMOXICILLIN 500 MG/1
500 TABLET, FILM COATED ORAL EVERY 12 HOURS
Qty: 20 TABLET | Refills: 0 | Status: SHIPPED | OUTPATIENT
Start: 2023-11-07 | End: 2023-11-17

## 2023-11-07 NOTE — PROGRESS NOTES
"Subjective:      Patient ID: Giovanna Arthur is a 47 y.o. female.    Vitals:  height is 5' 9" (1.753 m) and weight is 68 kg (150 lb). Her temperature is 98.6 °F (37 °C). Her blood pressure is 108/75 and her pulse is 77. Her respiration is 16 and oxygen saturation is 98%.     Chief Complaint: Generalized Body Aches    Patient complains of fever, body aches, cough, sore throat and left ear pain that started 3 days ago. She has been exposed to both covid and the flu. She has been taking tylenol with minimal relief.     Other  This is a new problem. The current episode started in the past 7 days. The problem occurs constantly. The problem has been gradually worsening. Associated symptoms include chills, congestion, coughing, diaphoresis, fatigue, a fever, headaches, myalgias and a sore throat. Pertinent negatives include no abdominal pain, chest pain, nausea, neck pain, rash or vomiting. She has tried acetaminophen for the symptoms. The treatment provided no relief.       Constitution: Positive for chills, sweating, fatigue and fever. Negative for activity change and appetite change.   HENT:  Positive for congestion, postnasal drip, sinus pressure and sore throat.    Neck: Negative for neck pain, neck stiffness and painful lymph nodes.   Cardiovascular:  Negative for chest pain and leg swelling.   Respiratory:  Positive for cough and sputum production.    Gastrointestinal:  Negative for abdominal pain, nausea and vomiting.   Musculoskeletal:  Positive for muscle ache.   Skin:  Negative for color change, pale, rash and wound.   Neurological:  Positive for headaches. Negative for dizziness, light-headedness, disorientation and altered mental status.   Hematologic/Lymphatic: Negative for swollen lymph nodes.   Psychiatric/Behavioral:  Negative for altered mental status, disorientation and confusion.       Objective:     Physical Exam   Constitutional: She is oriented to person, place, and time.  Non-toxic appearance. She does " not appear ill. No distress.   HENT:   Head: Normocephalic and atraumatic.   Ears:   Right Ear: A middle ear effusion is present.   Left Ear: No mastoid tenderness. Tympanic membrane is erythematous and bulging. Tympanic membrane is not perforated and not retracted. A middle ear effusion is present.   Nose: Mucosal edema and rhinorrhea present. Right sinus exhibits no maxillary sinus tenderness and no frontal sinus tenderness. Left sinus exhibits no maxillary sinus tenderness and no frontal sinus tenderness.   Mouth/Throat: Uvula is midline and mucous membranes are normal. Mucous membranes are moist. No trismus in the jaw. No uvula swelling. Posterior oropharyngeal erythema and cobblestoning present. No tonsillar exudate.   Eyes: Conjunctivae are normal.   Neck: Neck supple.   Cardiovascular: Normal rate, regular rhythm, normal heart sounds and normal pulses.   Pulmonary/Chest: Effort normal and breath sounds normal.   Abdominal: Normal appearance.   Musculoskeletal: Normal range of motion.         General: Normal range of motion.   Lymphadenopathy:     She has cervical adenopathy.        Right cervical: Superficial cervical adenopathy present.        Left cervical: Superficial cervical adenopathy present.   Neurological: no focal deficit. She is alert, oriented to person, place, and time and at baseline.   Skin: Skin is warm, dry and not diaphoretic.   Psychiatric: Her behavior is normal. Mood, judgment and thought content normal.   Nursing note and vitals reviewed.      Assessment:     1. Influenza A    2. Sore throat    3. Body aches    4. Nasal congestion    5. Left acute suppurative otitis media    6. Fever, unspecified fever cause        Plan:     Office Visit on 11/07/2023   Component Date Value Ref Range Status    SARS Coronavirus 2 Antigen 11/07/2023 Negative  Negative Final     Acceptable 11/07/2023 Yes   Final    POC Molecular Influenza A Ag 11/07/2023 Positive (A)  Negative, Not Reported  Final    POC Molecular Influenza B Ag 11/07/2023 Negative  Negative, Not Reported Final     Acceptable 11/07/2023 Yes   Final         Influenza A  -     oseltamivir (TAMIFLU) 75 MG capsule; Take 1 capsule (75 mg total) by mouth 2 (two) times daily. for 5 days  Dispense: 10 capsule; Refill: 0  -     brompheniramine-pseudoeph-DM (BROMFED DM) 2-30-10 mg/5 mL Syrp; Take 10 mLs by mouth every 4 (four) hours as needed (cough/congestion).  Dispense: 120 mL; Refill: 0  -     azelastine (ASTELIN) 137 mcg (0.1 %) nasal spray; 1 spray (137 mcg total) by Nasal route 2 (two) times daily.  Dispense: 30 mL; Refill: 0  -     fluticasone propionate (FLONASE) 50 mcg/actuation nasal spray; 1 spray (50 mcg total) by Each Nostril route once daily.  Dispense: 9.9 mL; Refill: 0    Sore throat  -     SARS Coronavirus 2 Antigen, POCT Manual Read  -     POCT Influenza A/B MOLECULAR    Body aches  -     SARS Coronavirus 2 Antigen, POCT Manual Read  -     POCT Influenza A/B MOLECULAR    Nasal congestion  -     SARS Coronavirus 2 Antigen, POCT Manual Read  -     POCT Influenza A/B MOLECULAR  -     brompheniramine-pseudoeph-DM (BROMFED DM) 2-30-10 mg/5 mL Syrp; Take 10 mLs by mouth every 4 (four) hours as needed (cough/congestion).  Dispense: 120 mL; Refill: 0  -     azelastine (ASTELIN) 137 mcg (0.1 %) nasal spray; 1 spray (137 mcg total) by Nasal route 2 (two) times daily.  Dispense: 30 mL; Refill: 0  -     fluticasone propionate (FLONASE) 50 mcg/actuation nasal spray; 1 spray (50 mcg total) by Each Nostril route once daily.  Dispense: 9.9 mL; Refill: 0    Left acute suppurative otitis media  -     amoxicillin (AMOXIL) 500 MG Tab; Take 1 tablet (500 mg total) by mouth every 12 (twelve) hours. for 10 days  Dispense: 20 tablet; Refill: 0    Fever, unspecified fever cause          Medical Decision Making:   Clinical Tests:   Lab Tests: Reviewed       <> Summary of Lab: Poct influenza A+    Patient Instructions   Please follow up  with your primary care provider within 2-5 days if your signs and symptoms have not resolved or worsen.     If your condition worsens or fails to improve we recommend that you receive another evaluation at the emergency room immediately or contact your primary medical clinic to discuss your concerns.   You must understand that you have received an Urgent Care treatment only and that you may be released before all of your medical problems are known or treated. You, the patient, will arrange for follow up care as instructed.       You have tested negative for COVID on our Binax test. As a follow up the recommendation is if you have symptoms within the first 7 days to retest within 48 hours. I you have NO SYMPTONS then you should test every 48 hours two more times.      Please take antibiotics to completion.  Alternate Tylenol/motrin as needed for fever/body aches.  Rest  Increase fluid intake      General Instructions for Upper Respiratory Infection (URI):     Alternate Tylenol and Ibuprofen every 3 hrs for fever, pain and inflammation.   Avoid NSAIDs (Ibuprofen, Aleve, Motrin, Aspirin) if you are pregnant, or have advanced kidney disease or history of stomach ulcers/bleeding.     Sore throat/Post Nasal Drip:  Salt water gargles, chloraseptic spray, lozenges, or cough drops   Honey/lemon water or warm tea   Cepachol   Zantac will help if there is reflux from the post nasal drip and helpful to take at night     Sinus Congestion/Runny nose:  Zyrtec/Claritin/Allegra during the day and Benadryl at night as needed  Mucinex, Dayquil, or Coricidin   If you DO NOT have Hypertension (high blood pressure) or any history of palpitations, it is ok to take over the counter Sudafed or Mucinex D or Allegra-D or Claritin-D or Zyrtec-D.  If you do take one of the above, it is ok to combine that with plain over the counter Mucinex or Allegra or Claritin or Zyrtec. If, for example, you are taking Zyrtec -D, you can combine that with  Mucinex, but not Mucinex-D. If you are taking Mucinex-D, you can combine that with plain Allegra or Claritin or Zyrtec.  If you DO have Hypertension or palpitations, it is safe to take Coricidin HBP for relief of sinus symptoms.  Nasal saline spray reduces inflammation and dryness  Flonase OTC or Nasacort OTC to help decrease inflammation in nasal turbinates and allow sinuses to drain  Warm face compresses/hot showers as often as you can to open sinuses and allow to drain.   Vicks vapor rub and/or humidifier at night  Cold-eeze helps to reduce the duration of URI symptoms if taken early  Elderberry, Emergen-C, and/or Zinc to reduce duration of viral URI symptoms    Cough:  Robitussin or Delsym as needed  Cough drops  Vicks vapor rub and/or humidifier at night       Rest as much as you can     Your symptoms are likely viral and will typically last 7-10 days, maybe longer depending on how it affects your body.  You are contagious until day 5-7, so minimize contact with others to reduce the spread to others and stay home from work or school as we discussed. Dehydration is preventable but is one of the main reasons why you will feel so badly. Drink pedialyte, gatorade or propel. Stay hydrated.  Antibiotics are not needed unless a complication( such as Otitis Media, Bacterial sinus infection or pneumonia) develops. Taking antibiotics for Flu/Cold is not supported by evidence-based medicine and can expose you to unnecessary side effects of the medication, such as anaphylaxis, yeast infection and leads to antibiotic resistance.     Please follow up with your primary care provider within 5-7 days if your signs and symptoms have not resolved or worsen.  If your condition worsens or fails to improve we recommend that you receive another evaluation at the emergency  room immediately or contact your primary medical clinic to discuss your concerns.  You must understand that you have received an Urgent Care treatment only and  that you may be released before all of  your medical problems are known or treated. You, the patient, will arrange for follow up care as instructed.    Go to Emergency Room immediately if you experience any:  Chest pain, shortness of breath, wheezing or difficulty breathing,  Severe headache, face, neck or ear pain,  New rash,  Fever over 101.5º F (38.6 C) for more than three days,  Confusion, behavior change or seizure,  Severe weakness or dizziness or passing out

## 2023-11-07 NOTE — PATIENT INSTRUCTIONS
Please follow up with your primary care provider within 2-5 days if your signs and symptoms have not resolved or worsen.     If your condition worsens or fails to improve we recommend that you receive another evaluation at the emergency room immediately or contact your primary medical clinic to discuss your concerns.   You must understand that you have received an Urgent Care treatment only and that you may be released before all of your medical problems are known or treated. You, the patient, will arrange for follow up care as instructed.       You have tested negative for COVID on our Binax test. As a follow up the recommendation is if you have symptoms within the first 7 days to retest within 48 hours. I you have NO SYMPTONS then you should test every 48 hours two more times.      Please take antibiotics to completion.  Alternate Tylenol/motrin as needed for fever/body aches.  Rest  Increase fluid intake      General Instructions for Upper Respiratory Infection (URI):     Alternate Tylenol and Ibuprofen every 3 hrs for fever, pain and inflammation.   Avoid NSAIDs (Ibuprofen, Aleve, Motrin, Aspirin) if you are pregnant, or have advanced kidney disease or history of stomach ulcers/bleeding.     Sore throat/Post Nasal Drip:  Salt water gargles, chloraseptic spray, lozenges, or cough drops   Honey/lemon water or warm tea   Cepachol   Zantac will help if there is reflux from the post nasal drip and helpful to take at night     Sinus Congestion/Runny nose:  Zyrtec/Claritin/Allegra during the day and Benadryl at night as needed  Mucinex, Dayquil, or Coricidin   If you DO NOT have Hypertension (high blood pressure) or any history of palpitations, it is ok to take over the counter Sudafed or Mucinex D or Allegra-D or Claritin-D or Zyrtec-D.  If you do take one of the above, it is ok to combine that with plain over the counter Mucinex or Allegra or Claritin or Zyrtec. If, for example, you are taking Zyrtec -D, you can  combine that with Mucinex, but not Mucinex-D. If you are taking Mucinex-D, you can combine that with plain Allegra or Claritin or Zyrtec.  If you DO have Hypertension or palpitations, it is safe to take Coricidin HBP for relief of sinus symptoms.  Nasal saline spray reduces inflammation and dryness  Flonase OTC or Nasacort OTC to help decrease inflammation in nasal turbinates and allow sinuses to drain  Warm face compresses/hot showers as often as you can to open sinuses and allow to drain.   Vicks vapor rub and/or humidifier at night  Cold-eeze helps to reduce the duration of URI symptoms if taken early  Elderberry, Emergen-C, and/or Zinc to reduce duration of viral URI symptoms    Cough:  Robitussin or Delsym as needed  Cough drops  Vicks vapor rub and/or humidifier at night       Rest as much as you can     Your symptoms are likely viral and will typically last 7-10 days, maybe longer depending on how it affects your body.  You are contagious until day 5-7, so minimize contact with others to reduce the spread to others and stay home from work or school as we discussed. Dehydration is preventable but is one of the main reasons why you will feel so badly. Drink pedialyte, gatorade or propel. Stay hydrated.  Antibiotics are not needed unless a complication( such as Otitis Media, Bacterial sinus infection or pneumonia) develops. Taking antibiotics for Flu/Cold is not supported by evidence-based medicine and can expose you to unnecessary side effects of the medication, such as anaphylaxis, yeast infection and leads to antibiotic resistance.     Please follow up with your primary care provider within 5-7 days if your signs and symptoms have not resolved or worsen.  If your condition worsens or fails to improve we recommend that you receive another evaluation at the emergency  room immediately or contact your primary medical clinic to discuss your concerns.  You must understand that you have received an Urgent Care  treatment only and that you may be released before all of  your medical problems are known or treated. You, the patient, will arrange for follow up care as instructed.    Go to Emergency Room immediately if you experience any:  Chest pain, shortness of breath, wheezing or difficulty breathing,  Severe headache, face, neck or ear pain,  New rash,  Fever over 101.5º F (38.6 C) for more than three days,  Confusion, behavior change or seizure,  Severe weakness or dizziness or passing out

## 2023-11-07 NOTE — LETTER
November 7, 2023      [facility] [place] [facility]  [facility] [facility] [place] [address]  Phone: [phone]  Fax: [phone]       Patient: Giovanna Arthur   YOB: 1975  Date of Visit: 11/07/2023    To Whom It May Concern:    Jesus Manuel Arthur  was at [facility] on 11/07/2023. The patient may return to work/school on 11/13/2023 with no restrictions. If you have any questions or concerns, or if I can be of further assistance, please do not hesitate to contact me.    Sincerely,    Latonya Mcpherson NP

## 2024-07-09 ENCOUNTER — TELEPHONE (OUTPATIENT)
Dept: GASTROENTEROLOGY | Facility: CLINIC | Age: 49
End: 2024-07-09
Payer: COMMERCIAL

## 2024-07-09 NOTE — TELEPHONE ENCOUNTER
Patient is geovanna for ov on 8/5/24 @ 10:00. Patients egd is also on the books for 9/10/24 and patient is aware of both apts. 7/9/24 LRA

## 2024-07-09 NOTE — TELEPHONE ENCOUNTER
Patient recently went on vacation to [place]. Patient ended up in er and they flew her from [place] to [place]. Patient had eeg done. Patient was dx with gastro bleed. Patient flys back home [place]. Please advise when to geovanna patient. 7/9/24 LRA       ----- Message from Meek Juárez sent at 7/8/2024  8:03 AM CDT -----  Contact: Giovanna  Patient called in regards to she was out of town and admitted to the hospital [place] due to another gastro bleed and she said she think she will be discharged on today. Another EEG was did but  said to have an appointment set. Call back is [phone]

## 2024-08-05 ENCOUNTER — OFFICE VISIT (OUTPATIENT)
Dept: GASTROENTEROLOGY | Facility: CLINIC | Age: 49
End: 2024-08-05
Payer: COMMERCIAL

## 2024-08-05 ENCOUNTER — TELEPHONE (OUTPATIENT)
Dept: GASTROENTEROLOGY | Facility: CLINIC | Age: 49
End: 2024-08-05

## 2024-08-05 VITALS
BODY MASS INDEX: 22.31 KG/M2 | RESPIRATION RATE: 18 BRPM | SYSTOLIC BLOOD PRESSURE: 101 MMHG | OXYGEN SATURATION: 98 % | HEART RATE: 98 BPM | WEIGHT: 150.63 LBS | DIASTOLIC BLOOD PRESSURE: 69 MMHG | HEIGHT: 69 IN

## 2024-08-05 DIAGNOSIS — Z86.010 HISTORY OF COLON POLYPS: ICD-10-CM

## 2024-08-05 DIAGNOSIS — D50.9 IRON DEFICIENCY ANEMIA, UNSPECIFIED IRON DEFICIENCY ANEMIA TYPE: Primary | ICD-10-CM

## 2024-08-05 DIAGNOSIS — K28.9 ANASTOMOTIC ULCER S/P GASTRIC BYPASS: ICD-10-CM

## 2024-08-05 PROCEDURE — 3008F BODY MASS INDEX DOCD: CPT | Mod: CPTII,,,

## 2024-08-05 PROCEDURE — 1160F RVW MEDS BY RX/DR IN RCRD: CPT | Mod: CPTII,,,

## 2024-08-05 PROCEDURE — 3074F SYST BP LT 130 MM HG: CPT | Mod: CPTII,,,

## 2024-08-05 PROCEDURE — 3078F DIAST BP <80 MM HG: CPT | Mod: CPTII,,,

## 2024-08-05 PROCEDURE — 1159F MED LIST DOCD IN RCRD: CPT | Mod: CPTII,,,

## 2024-08-05 PROCEDURE — 99215 OFFICE O/P EST HI 40 MIN: CPT | Mod: ,,,

## 2024-08-05 RX ORDER — PANTOPRAZOLE SODIUM 40 MG/1
40 TABLET, DELAYED RELEASE ORAL 2 TIMES DAILY
Qty: 180 TABLET | Refills: 4 | Status: SHIPPED | OUTPATIENT
Start: 2024-08-05

## 2024-08-05 RX ORDER — SUCRALFATE 1 G/1
1 TABLET ORAL
COMMUNITY
Start: 2024-07-08

## 2024-09-03 ENCOUNTER — TELEPHONE (OUTPATIENT)
Dept: GASTROENTEROLOGY | Facility: CLINIC | Age: 49
End: 2024-09-03
Payer: COMMERCIAL

## 2024-09-03 DIAGNOSIS — K28.9 ANASTOMOTIC ULCER S/P GASTRIC BYPASS: ICD-10-CM

## 2024-09-03 DIAGNOSIS — D50.9 IRON DEFICIENCY ANEMIA, UNSPECIFIED IRON DEFICIENCY ANEMIA TYPE: Primary | ICD-10-CM

## 2024-09-03 NOTE — TELEPHONE ENCOUNTER
"[facility] [facility]  [address] [place] [address]  Phone: [phone]  Fax: [phone]    History & Physical         Provider: Dr. Evon Matthews    Patient Name: Giovanna ARIAS (age):1975  48 y.o.           Gender: female   Phone: [phone]     Referring Physician: Nubia Vasquez     Vital Signs:   Height - 5' 9"  Weight - 150 lb  BMI -  22.24    Plan: EGD @ [facility]    Encounter Diagnoses   Name Primary?    Iron deficiency anemia, unspecified iron deficiency anemia type Yes    Anastomotic ulcer S/P gastric bypass            History:      Past Medical History:   Diagnosis Date    History of peptic ulcer disease     Hypothyroidism, unspecified     FARIHA (iron deficiency anemia)     Personal history of colonic polyps     Systemic lupus erythematosus, organ or system involvement unspecified       Past Surgical History:   Procedure Laterality Date    BACK SURGERY      CHOLECYSTECTOMY      COLONOSCOPY      EGD - EXTERNAL RESULT      GASTRIC BYPASS      HYSTERECTOMY      THYROIDECTOMY        Medication List with Changes/Refills   Current Medications    AZELASTINE (ASTELIN) 137 MCG (0.1 %) NASAL SPRAY    1 spray (137 mcg total) by Nasal route 2 (two) times daily.    FERRIC CARBOXYMALTOSE (INJECTAFER) 50 MG IRON/ML INJECTION    Inject 15 mLs (750 mg total) into the vein once a week. Inject Injectafer 750mg IV x 2 doses >7 days apart    FERROUS SULFATE (IRON) 325 MG (65 MG IRON) TAB TABLET    Take 1 tablet (325 mg total) by mouth 3 (three) times daily.    FLUTICASONE PROPIONATE (FLONASE) 50 MCG/ACTUATION NASAL SPRAY    1 spray (50 mcg total) by Each Nostril route once daily.    LEVOTHYROXINE (SYNTHROID) 200 MCG TABLET    Take 250 mcg by mouth before breakfast. Every other day    PANTOPRAZOLE (PROTONIX) 40 MG TABLET    Take 1 tablet (40 mg total) by mouth 2 (two) times daily.    SUCRALFATE (CARAFATE) 1 GRAM " TABLET    Take 1 g by mouth 4 (four) times daily before meals and nightly.      Review of patient's allergies indicates:  No Known Allergies   Family History   Problem Relation Name Age of Onset    No Known Problems Mother      No Known Problems Father      Breast cancer Maternal Grandmother        Social History     Tobacco Use    Smoking status: Never    Smokeless tobacco: Never   Substance Use Topics    Alcohol use: Yes     Comment: socially    Drug use: Never        Physical Examination:     General Appearance:___________________________  HEENT: _____________________________________  Abdomen:____________________________________  Heart:________________________________________  Lungs:_______________________________________  Extremities:___________________________________  Skin:_________________________________________  Endocrine:____________________________________  Genitourinary:_________________________________  Neurological:__________________________________      Patient has been evaluated immediately prior to sedation and is medically cleared for endoscopy with IVCS as an ASA class: ______      Physician Signature: _________________________       Date: ________  Time: ________

## 2024-09-03 NOTE — TELEPHONE ENCOUNTER
S/w pt and told her that I was calling as a courtesy regarding up coming EGD with NBP on 9/10/24, Tuesday and wanted to verify that she has her paper prep instructions. Pt stated she has the instructions. I also mentioned that [facility] will call the day before (MON) with the arrival time, GI Lab is located on the third floor, and to pre-register any time this week. marc

## 2024-09-06 ENCOUNTER — PATIENT MESSAGE (OUTPATIENT)
Dept: PRIMARY CARE CLINIC | Facility: CLINIC | Age: 49
End: 2024-09-06
Payer: COMMERCIAL

## 2024-09-10 ENCOUNTER — OUTSIDE PLACE OF SERVICE (OUTPATIENT)
Dept: GASTROENTEROLOGY | Facility: CLINIC | Age: 49
End: 2024-09-10

## 2024-09-16 ENCOUNTER — TELEPHONE (OUTPATIENT)
Dept: GASTROENTEROLOGY | Facility: CLINIC | Age: 49
End: 2024-09-16
Payer: COMMERCIAL

## 2024-09-16 NOTE — TELEPHONE ENCOUNTER
Gastric pouch Bx: wnl w/o Hp.    Notify patient that her stomach pouch Bx are benign w/o Hp infection. Continue panto 40 BID given her GI bleed off NSAIDs and on panto 40 daily. Repeat iron studies. Orders from 8/5/2024 OV may be completed. Also, seems a [facility] provider has her on his schedule for 9/24/2024. Make one year follow up OV with me.  CARMELITA

## 2024-09-30 NOTE — TELEPHONE ENCOUNTER
Called patient; No answer, left VM informing patient that I was calling with results and to please return my call. -DANTE, LPN

## 2024-11-12 ENCOUNTER — TELEPHONE (OUTPATIENT)
Dept: GASTROENTEROLOGY | Facility: CLINIC | Age: 49
End: 2024-11-12
Payer: COMMERCIAL

## 2024-11-12 NOTE — TELEPHONE ENCOUNTER
----- Message from Ariel Wilkinson sent at 11/11/2024  4:29 PM CST -----  Regarding: Lab Results  Contact: self    ----- Message -----  From: Flor Marcus  Sent: 11/11/2024  12:59 PM CST  To: [facility] Staff    Patient is requesting a call back regarding f/u labs. Please call back at [phone]   Spoke with daughter (Christy Caodaism: [phone], [phone]) and son (Asis: [phone]). Informed of current status. Questions answered. Confirmed full code. Spoke with family (Christy Rastafarian: [phone], [phone], Dakota: [phone]). Informed of current status. Questions answered. Confirmed full code.

## 2024-11-12 NOTE — TELEPHONE ENCOUNTER
Staff return pt call.. she was needing to know where to have labs drawn.. staff advised her to have them done at [facility] on [facility] [place].... CHRISTOPH

## 2024-11-21 ENCOUNTER — TELEPHONE (OUTPATIENT)
Dept: GASTROENTEROLOGY | Facility: CLINIC | Age: 49
End: 2024-11-21
Payer: COMMERCIAL

## 2024-11-21 NOTE — TELEPHONE ENCOUNTER
Returned pt call. Pt wanted to know if we have her test results back yet. I told her no, once we get them and the providers are able to review them we will call her back. marc

## 2024-11-21 NOTE — TELEPHONE ENCOUNTER
----- Message from Flor sent at 11/21/2024  3:47 PM CST -----  Contact: self  Type:  Test Results    Who Called: Giovanna Arthur  Name of Test (Lab/Mammo/Etc): blood work  Date of Test: 3 days ago  Ordering Provider: chuckie  Where the test was performed: [facility]  Would the patient rather a call back or a response via [facility]? Call back  Best Call Back Number: [phone]  Additional Information:  n/a

## 2024-12-02 ENCOUNTER — TELEPHONE (OUTPATIENT)
Dept: GASTROENTEROLOGY | Facility: CLINIC | Age: 49
End: 2024-12-02

## 2024-12-02 NOTE — TELEPHONE ENCOUNTER
Notify patient her blood count and iron levels were in normal range. Continue pantoprazole 40 mg twice a day. Keep OV with Dr. Matthews and notify us sooner if any issues.    11/19/2024: Iron 62, %sat 23.8, retic/trnsfrrn/CBC nl, Hgb 13.5, ferr 71. IV iron 8/5/2024.  MLC

## 2024-12-05 NOTE — TELEPHONE ENCOUNTER
Called pt and went over [facility] chart notes. Pt wants to know if she could have 2 more iron infusion? Pt stated she know that her levels are at normal range, but the infusions made her feel great and she can tell a difference. marc

## 2025-01-31 NOTE — TELEPHONE ENCOUNTER
Spoke with patient, informed her that per NBP, would not be able to get PA from insurance without the active FARIHA diagnosis. -DANTE,LPN

## 2025-03-25 ENCOUNTER — TELEPHONE (OUTPATIENT)
Dept: HEMATOLOGY/ONCOLOGY | Facility: CLINIC | Age: 50
End: 2025-03-25
Payer: COMMERCIAL

## 2025-03-25 NOTE — TELEPHONE ENCOUNTER
Called the patient regarding referral. Appointment date and time as well as location provided. TTRN

## 2025-03-26 ENCOUNTER — OFFICE VISIT (OUTPATIENT)
Dept: HEMATOLOGY/ONCOLOGY | Facility: CLINIC | Age: 50
End: 2025-03-26
Payer: COMMERCIAL

## 2025-03-26 VITALS
OXYGEN SATURATION: 97 % | DIASTOLIC BLOOD PRESSURE: 81 MMHG | HEIGHT: 69 IN | SYSTOLIC BLOOD PRESSURE: 115 MMHG | HEART RATE: 85 BPM | WEIGHT: 165.19 LBS | BODY MASS INDEX: 24.47 KG/M2 | RESPIRATION RATE: 16 BRPM

## 2025-03-26 DIAGNOSIS — D70.9 NEUTROPENIA, UNSPECIFIED TYPE: Primary | ICD-10-CM

## 2025-03-26 LAB
ABS NRBC COUNT: 0 X 10 3/UL (ref 0–0.01)
ABSOLUTE BASOPHIL: 0.03 X 10 3/UL (ref 0–0.22)
ABSOLUTE EOSINOPHIL: 0.12 X 10 3/UL (ref 0.04–0.54)
ABSOLUTE LYMPHOCYTE: 1.23 X 10 3/UL (ref 0.86–4.75)
ABSOLUTE MONOCYTE: 0.24 X 10 3/UL (ref 0.22–1.08)
ABSOLUTE RETIC: 0.1 X 10 6/UL (ref 0.02–0.08)
ADDITIONAL TESTING: NORMAL
B19V IGG+IGM PNL SER: 12 % (ref 3–15.9)
BANDS: ABNORMAL
BASOPHILS NFR BLD: 1 % (ref 0–1)
BLAST CELL: ABNORMAL
CORTIS SERPL-MCNC: ABNORMAL UG/DL
EOSINOPHIL NFR BLD: 4 % (ref 1–7)
FERRITIN: 30.2 NG/ML (ref 10–232)
HCT VFR BLD AUTO: 37.5 % (ref 37–47)
HGB BLD-MCNC: 12.6 G/DL (ref 12–16)
IPF: 7.4 % (ref 1.1–6.1)
IRON BINDING CAPACITY: 294 UG/DL (ref 262–472)
IRON SERPL-MCNC: 113 UG/DL (ref 37–145)
LYMPHOCYTES NFR BLD: 39 % (ref 19–53)
MCH RBC QN AUTO: 32.5 PG (ref 27–32)
MCHC RBC AUTO-ENTMCNC: 33.6 G/DL (ref 32–36)
MCV RBC AUTO: 96.6 FL (ref 82–100)
METAMYELOCYTES # BLD MANUAL: ABNORMAL 10*3/UL
MONOCYTES NFR BLD: 8 % (ref 5–13)
NEUTROPHILS # BLD AUTO: 1.32 X 10 3/UL (ref 2.32–6.7)
NUCLEATED RED BLOOD CELLS: 0 /100 WBC (ref 0–0.2)
OTHER: ABNORMAL
PATHOLOGIST REVIEW: ABNORMAL
PLATELET # BLD AUTO: 194 X 10 3/UL (ref 135–400)
PROMYELOCYTES: ABNORMAL
RBC # BLD AUTO: 3.88 X 10 6/UL (ref 4.2–5.4)
RBC & PLT MORPHOLOGY: ABNORMAL
RDW-SD: 48.5 FL (ref 37–54)
REACTIVE LYMPHOCYTES: 3 %
RET-HE: 37.7 PG (ref 29.8–39)
RETICULOCYTE COUNT: 2.5 % (ref 0.5–1.7)
SEGMENTERS: 45 % (ref 34–71)
TSH SERPL DL<=0.005 MIU/L-ACNC: 0.17 UIU/ML (ref 0.27–4.2)
UIBC SERPL-MCNC: 181 UG/DL (ref 112–306)
WBC # BLD: 2.94 X 10 3/UL (ref 4.3–10.8)

## 2025-03-26 NOTE — PROGRESS NOTES
HEMATOLOGY INITIAL CONSULTATION NOTE    Patient ID: Giovanna Arthur is a 49 y.o. female.    Chief Complaint:  Leukopenia/neutropenia    HPI:  Patient is a 49-year-old female with past medical history of hypothyroidism, history of peptic ulcer disease, iron deficiency anemia, history of colonic polyps, questionable history of SLE and fibromyalgia and follows with Rheumatology who was referred by her PCP for evaluation of neutropenia noted on her labs.  Patient voices no acute complaints.  Denies any lumps/bumps, activity and appetite changes.  Presents to our clinic today for further evaluation.                      Past Medical History:   Diagnosis Date    History of peptic ulcer disease     Hypothyroidism, unspecified     FARIHA (iron deficiency anemia)     Personal history of colonic polyps     Systemic lupus erythematosus, organ or system involvement unspecified        Family History   Problem Relation Name Age of Onset    No Known Problems Mother      No Known Problems Father      Breast cancer Maternal Grandmother         Social History[1]      Past Surgical History:   Procedure Laterality Date    BACK SURGERY      CHOLECYSTECTOMY      COLONOSCOPY      EGD - EXTERNAL RESULT      EGD, WITH CLOSED BIOPSY  09/10/2024    GASTRIC BYPASS  2017    HYSTERECTOMY      THYROIDECTOMY                         Review of systems:  Review of Systems   Constitutional:  Negative for activity change, appetite change, chills, diaphoresis, fatigue and unexpected weight change.   HENT:  Negative for congestion, facial swelling, hearing loss, mouth sores, trouble swallowing and voice change.    Eyes:  Negative for photophobia, pain, discharge and itching.   Respiratory:  Negative for apnea, cough, choking, chest tightness and shortness of breath.    Cardiovascular:  Negative for chest pain, palpitations and leg swelling.   Gastrointestinal:  Negative for abdominal distention, abdominal pain, anal bleeding and blood in stool.   Endocrine:  Negative for cold intolerance, heat intolerance, polydipsia and polyphagia.   Genitourinary:  Negative for difficulty urinating, dysuria, flank pain and hematuria.   Musculoskeletal:  Negative for arthralgias, back pain, joint swelling, myalgias, neck pain and neck stiffness.   Skin:  Negative for color change, pallor and wound.   Allergic/Immunologic: Negative for environmental allergies, food allergies and immunocompromised state.   Neurological:  Negative for dizziness, seizures, facial asymmetry, speech difficulty, light-headedness, numbness and headaches.   Hematological:  Negative for adenopathy. Does not bruise/bleed easily.   Psychiatric/Behavioral:  Negative for agitation, behavioral problems, confusion, decreased concentration and sleep disturbance.                                Physical Exam  Vitals and nursing note reviewed.   Constitutional:       General: She is not in acute distress.     Appearance: Normal appearance. She is not ill-appearing.   HENT:      Head: Normocephalic and atraumatic.      Nose: No congestion or rhinorrhea.   Eyes:      General: No scleral icterus.     Extraocular Movements: Extraocular movements intact.      Pupils: Pupils are equal, round, and reactive to light.   Cardiovascular:      Rate and Rhythm: Normal rate and regular rhythm.      Pulses: Normal pulses.      Heart sounds: Normal heart sounds. No murmur heard.     No gallop.   Pulmonary:      Effort: Pulmonary effort is normal. No respiratory distress.      Breath sounds: Normal breath sounds. No stridor. No wheezing or rhonchi.   Abdominal:      General: Bowel sounds are normal. There is no distension.      Palpations: There is no mass.      Tenderness: There is no abdominal tenderness. There is no guarding.   Musculoskeletal:         General: No swelling, tenderness, deformity or signs of injury. Normal range of motion.      Cervical back: Normal range of motion and neck supple. No rigidity. No muscular tenderness.       Right lower leg: No edema.      Left lower leg: No edema.   Skin:     General: Skin is warm.      Coloration: Skin is not jaundiced or pale.      Findings: No bruising or lesion.   Neurological:      General: No focal deficit present.      Mental Status: She is alert and oriented to person, place, and time.      Cranial Nerves: No cranial nerve deficit.      Sensory: No sensory deficit.      Motor: No weakness.      Gait: Gait normal.   Psychiatric:         Mood and Affect: Mood normal.         Behavior: Behavior normal.         Thought Content: Thought content normal.       Vitals:    03/26/25 0821   BP: 115/81   Pulse: 85   Resp: 16   Body surface area is 1.91 meters squared.    Assessment/Plan:      Neutropenia:    == seems to be chronic/ benign cause at this time.  As she was noted to have neutropenia on her prior labs as well.  Discussed with patient in detail benign etiologies for neutropenia giving her opportunity to ask questions.  Will obtain flow cytometry, peripheral smear for further evaluation.  Discussed with her that if any abnormalities are noted on flow then will consider a bone marrow biopsy.  Patient also reports prior gastric bypass surgery.  I will obtain serum copper and zinc levels for completion along with checking for TSH as patient has hypothyroidism      Plan:  Peripheral smear and flow cytometry  Serum copper and zinc level  TSH            Pt is instructed to RTC with labs for continued monitoring of treatment as instructed.     Total time spent in counseling and discussion about further management options including relevant lab work, treatment,  prognosis, medications and intended side effects was more than 60 minutes. More than 50 % of the time was spent in counseling and coordination of care.  I spent a total of 60 minutes on the day of the visit.This includes face to face time and non-face to face time preparing to see the patient (eg, review of tests), Obtaining and/or reviewing  separately obtained history, Documenting clinical information in the electronic or other health record, Independently interpreting resultsand communicating results to the patient/family/caregiver, or Care coordination.          [1]   Social History  Socioeconomic History    Marital status: Single   Tobacco Use    Smoking status: Former     Types: Cigarettes    Smokeless tobacco: Never   Substance and Sexual Activity    Alcohol use: Yes     Comment: socially    Drug use: Never     Social Drivers of Health     Food Insecurity: Low Risk  (7/6/2024)    Received from [facility]    IP Discharge Planning Review     In the past 12 months have you experienced difficulty with any of the following?: No difficulties reported   Transportation Needs: Low Risk  (7/6/2024)    Received from [facility] and [place]    IP Discharge Planning Review     In the past 12 months have you experienced difficulty with any of the following?: No difficulties reported   Housing Stability: Low Risk  (7/6/2024)    Received from [facility]    IP Discharge Planning Review     In the past 12 months have you experienced difficulty with any of the following?: No difficulties reported

## 2025-04-29 ENCOUNTER — OFFICE VISIT (OUTPATIENT)
Dept: HEMATOLOGY/ONCOLOGY | Facility: CLINIC | Age: 50
End: 2025-04-29
Payer: COMMERCIAL

## 2025-04-29 VITALS
BODY MASS INDEX: 25.46 KG/M2 | RESPIRATION RATE: 16 BRPM | DIASTOLIC BLOOD PRESSURE: 75 MMHG | HEART RATE: 73 BPM | OXYGEN SATURATION: 94 % | HEIGHT: 69 IN | WEIGHT: 171.88 LBS | SYSTOLIC BLOOD PRESSURE: 108 MMHG

## 2025-04-29 DIAGNOSIS — D70.9 NEUTROPENIA, UNSPECIFIED TYPE: Primary | ICD-10-CM

## 2025-04-29 PROCEDURE — 3008F BODY MASS INDEX DOCD: CPT | Mod: CPTII,,, | Performed by: INTERNAL MEDICINE

## 2025-04-29 PROCEDURE — 3074F SYST BP LT 130 MM HG: CPT | Mod: CPTII,,, | Performed by: INTERNAL MEDICINE

## 2025-04-29 PROCEDURE — 3078F DIAST BP <80 MM HG: CPT | Mod: CPTII,,, | Performed by: INTERNAL MEDICINE

## 2025-04-29 PROCEDURE — 99215 OFFICE O/P EST HI 40 MIN: CPT | Mod: S$PBB,,, | Performed by: INTERNAL MEDICINE

## 2025-04-29 PROCEDURE — 1159F MED LIST DOCD IN RCRD: CPT | Mod: CPTII,,, | Performed by: INTERNAL MEDICINE

## 2025-04-29 NOTE — PROGRESS NOTES
HEMATOLOGY FOLLOW UP CONSULTATION NOTE    Patient ID: Giovanna Arthur is a 49 y.o. female.    Chief Complaint:  Leukopenia/neutropenia    HPI:  Patient is a 49-year-old female with past medical history of hypothyroidism, history of peptic ulcer disease, iron deficiency anemia, history of colonic polyps, questionable history of SLE and fibromyalgia and follows with Rheumatology who was referred by her PCP for evaluation of neutropenia noted on her labs.  Patient voices no acute complaints.  Denies any lumps/bumps, activity and appetite changes.  Presents to our clinic today for further evaluation.                      Past Medical History:   Diagnosis Date    History of peptic ulcer disease     Hypothyroidism, unspecified     FARIHA (iron deficiency anemia)     Personal history of colonic polyps     Systemic lupus erythematosus, organ or system involvement unspecified        Family History   Problem Relation Name Age of Onset    No Known Problems Mother      No Known Problems Father      Breast cancer Maternal Grandmother         Social History[1]      Past Surgical History:   Procedure Laterality Date    BACK SURGERY      CHOLECYSTECTOMY      COLONOSCOPY      EGD - EXTERNAL RESULT      EGD, WITH CLOSED BIOPSY  09/10/2024    GASTRIC BYPASS  2017    HYSTERECTOMY      THYROIDECTOMY                         Review of systems:  Review of Systems   Constitutional:  Negative for activity change, appetite change, chills, diaphoresis, fatigue and unexpected weight change.   HENT:  Negative for congestion, facial swelling, hearing loss, mouth sores, trouble swallowing and voice change.    Eyes:  Negative for photophobia, pain, discharge and itching.   Respiratory:  Negative for apnea, cough, choking, chest tightness and shortness of breath.    Cardiovascular:  Negative for chest pain, palpitations and leg swelling.   Gastrointestinal:  Negative for abdominal distention, abdominal pain, anal bleeding and blood in stool.   Endocrine:  Negative for cold intolerance, heat intolerance, polydipsia and polyphagia.   Genitourinary:  Negative for difficulty urinating, dysuria, flank pain and hematuria.   Musculoskeletal:  Negative for arthralgias, back pain, joint swelling, myalgias, neck pain and neck stiffness.   Skin:  Negative for color change, pallor and wound.   Allergic/Immunologic: Negative for environmental allergies, food allergies and immunocompromised state.   Neurological:  Negative for dizziness, seizures, facial asymmetry, speech difficulty, light-headedness, numbness and headaches.   Hematological:  Negative for adenopathy. Does not bruise/bleed easily.   Psychiatric/Behavioral:  Negative for agitation, behavioral problems, confusion, decreased concentration and sleep disturbance.                                Physical Exam  Vitals and nursing note reviewed.   Constitutional:       General: She is not in acute distress.     Appearance: Normal appearance. She is not ill-appearing.   HENT:      Head: Normocephalic and atraumatic.      Nose: No congestion or rhinorrhea.   Eyes:      General: No scleral icterus.     Extraocular Movements: Extraocular movements intact.      Pupils: Pupils are equal, round, and reactive to light.   Cardiovascular:      Rate and Rhythm: Normal rate and regular rhythm.      Pulses: Normal pulses.      Heart sounds: Normal heart sounds. No murmur heard.     No gallop.   Pulmonary:      Effort: Pulmonary effort is normal. No respiratory distress.      Breath sounds: Normal breath sounds. No stridor. No wheezing or rhonchi.   Abdominal:      General: Bowel sounds are normal. There is no distension.      Palpations: There is no mass.      Tenderness: There is no abdominal tenderness. There is no guarding.   Musculoskeletal:         General: No swelling, tenderness, deformity or signs of injury. Normal range of motion.      Cervical back: Normal range of motion and neck supple. No rigidity. No muscular tenderness.       Right lower leg: No edema.      Left lower leg: No edema.   Skin:     General: Skin is warm.      Coloration: Skin is not jaundiced or pale.      Findings: No bruising or lesion.   Neurological:      General: No focal deficit present.      Mental Status: She is alert and oriented to person, place, and time.      Cranial Nerves: No cranial nerve deficit.      Sensory: No sensory deficit.      Motor: No weakness.      Gait: Gait normal.   Psychiatric:         Mood and Affect: Mood normal.         Behavior: Behavior normal.         Thought Content: Thought content normal.       Vitals:    04/29/25 0921   BP: 108/75   Pulse: 73   Resp: 16   Body surface area is 1.95 meters squared.    Assessment/Plan:      Neutropenia:    == seems to be chronic/ benign cause at this time.  As she was noted to have neutropenia on her prior labs as well.  Discussed with patient in detail benign etiologies for neutropenia giving her opportunity to ask questions.  Will obtain flow cytometry, peripheral smear for further evaluation.  Discussed with her that if any abnormalities are noted on flow then will consider a bone marrow biopsy.  Patient also reports prior gastric bypass surgery.  I will obtain serum copper and zinc levels for completion along with checking for TSH as patient has hypothyroidism  == 4/29/25:  Peripheral blood smear revealed leukopenia with an absolute neutropenia.  Normocytic, normochromic anemia with normal red cell distribution with an increased reticulocytes.  No diagnostic immunophenotypic abnormalities detected via flow cytometry.  Discussed with patient possible etiologies of benign neutropenia in her case instead of a primary bone marrow disorder giving her opportunity to ask questions.  No indication for bone marrow biopsy.  Serum copper and zinc levels were normal. Will hence continue with observation    Plan:  Continue observation    RTC in 6 months for MD visit with labs: CBC with diff prior      Total  time spent in counseling and discussion about further management options including relevant lab work, treatment,  prognosis, medications and intended side effects was more than 40 minutes. More than 50 % of the time was spent in counseling and coordination of care.  I spent a total of 40 minutes on the day of the visit.This includes face to face time and non-face to face time preparing to see the patient (eg, review of tests), Obtaining and/or reviewing separately obtained history, Documenting clinical information in the electronic or other health record, Independently interpreting resultsand communicating results to the patient/family/caregiver, or Care coordination.          [1]   Social History  Socioeconomic History    Marital status: Single   Tobacco Use    Smoking status: Former     Types: Cigarettes    Smokeless tobacco: Never   Substance and Sexual Activity    Alcohol use: Yes     Comment: socially    Drug use: Never     Social Drivers of Health     Food Insecurity: Low Risk  (7/6/2024)    Received from [facility] and [place]    IP Discharge Planning Review     In the past 12 months have you experienced difficulty with any of the following?: No difficulties reported   Transportation Needs: Low Risk  (7/6/2024)    Received from [facility]    IP Discharge Planning Review     In the past 12 months have you experienced difficulty with any of the following?: No difficulties reported   Housing Stability: Low Risk  (7/6/2024)    Received from [facility]    IP Discharge Planning Review     In the past 12 months have you experienced difficulty with any of the following?: No difficulties reported

## 2025-05-21 DIAGNOSIS — D50.9 IRON DEFICIENCY ANEMIA, UNSPECIFIED IRON DEFICIENCY ANEMIA TYPE: Primary | ICD-10-CM

## 2025-06-12 ENCOUNTER — OFFICE VISIT (OUTPATIENT)
Dept: OBSTETRICS AND GYNECOLOGY | Facility: CLINIC | Age: 50
End: 2025-06-12
Payer: COMMERCIAL

## 2025-06-12 VITALS
SYSTOLIC BLOOD PRESSURE: 114 MMHG | WEIGHT: 169 LBS | BODY MASS INDEX: 25.03 KG/M2 | HEART RATE: 99 BPM | DIASTOLIC BLOOD PRESSURE: 80 MMHG | HEIGHT: 69 IN

## 2025-06-12 DIAGNOSIS — N89.8 VAGINAL LEUKORRHEA: ICD-10-CM

## 2025-06-12 DIAGNOSIS — N39.0 CHRONIC UTI: ICD-10-CM

## 2025-06-12 DIAGNOSIS — Z01.419 GYNECOLOGIC EXAM NORMAL: Primary | ICD-10-CM

## 2025-06-12 DIAGNOSIS — N89.8 VAGINAL DRYNESS: ICD-10-CM

## 2025-06-12 PROBLEM — D62 ACUTE BLOOD LOSS ANEMIA: Status: ACTIVE | Noted: 2025-06-12

## 2025-06-12 PROBLEM — K25.9 GASTRIC ULCER: Status: ACTIVE | Noted: 2024-07-08

## 2025-06-12 PROBLEM — K92.2 UPPER GI BLEED: Status: ACTIVE | Noted: 2024-07-07

## 2025-06-12 PROBLEM — M79.7 FIBROMYALGIA: Status: ACTIVE | Noted: 2025-06-12

## 2025-06-12 PROBLEM — M32.9 SYSTEMIC LUPUS ERYTHEMATOSUS: Status: ACTIVE | Noted: 2025-06-12

## 2025-06-12 PROBLEM — G47.26 SHIFT WORK SLEEP DISORDER: Status: ACTIVE | Noted: 2025-06-12

## 2025-06-12 PROCEDURE — 99386 PREV VISIT NEW AGE 40-64: CPT | Mod: S$PBB,,, | Performed by: NURSE PRACTITIONER

## 2025-06-12 PROCEDURE — 3079F DIAST BP 80-89 MM HG: CPT | Mod: CPTII,,, | Performed by: NURSE PRACTITIONER

## 2025-06-12 PROCEDURE — 1159F MED LIST DOCD IN RCRD: CPT | Mod: CPTII,,, | Performed by: NURSE PRACTITIONER

## 2025-06-12 PROCEDURE — 3008F BODY MASS INDEX DOCD: CPT | Mod: CPTII,,, | Performed by: NURSE PRACTITIONER

## 2025-06-12 PROCEDURE — 1160F RVW MEDS BY RX/DR IN RCRD: CPT | Mod: CPTII,,, | Performed by: NURSE PRACTITIONER

## 2025-06-12 PROCEDURE — 3074F SYST BP LT 130 MM HG: CPT | Mod: CPTII,,, | Performed by: NURSE PRACTITIONER

## 2025-06-12 RX ORDER — LEVOFLOXACIN 750 MG/1
750 TABLET, FILM COATED ORAL
COMMUNITY
Start: 2025-02-05

## 2025-06-12 RX ORDER — CYANOCOBALAMIN 1000 UG/ML
INJECTION, SOLUTION INTRAMUSCULAR; SUBCUTANEOUS
COMMUNITY
End: 2025-06-12

## 2025-06-12 RX ORDER — SULFAMETHOXAZOLE AND TRIMETHOPRIM 800; 160 MG/1; MG/1
1 TABLET ORAL 2 TIMES DAILY
COMMUNITY
Start: 2025-02-05 | End: 2025-06-12

## 2025-06-12 RX ORDER — BUSPIRONE HYDROCHLORIDE 7.5 MG/1
TABLET ORAL
COMMUNITY
End: 2025-06-12

## 2025-06-12 RX ORDER — QUETIAPINE FUMARATE 100 MG/1
100 TABLET, FILM COATED ORAL NIGHTLY
COMMUNITY
Start: 2025-04-04 | End: 2025-06-12

## 2025-06-12 RX ORDER — LEVOTHYROXINE SODIUM 25 UG/1
TABLET ORAL
COMMUNITY

## 2025-06-12 RX ORDER — DULOXETIN HYDROCHLORIDE 60 MG/1
60 CAPSULE, DELAYED RELEASE ORAL 2 TIMES DAILY
COMMUNITY
Start: 2025-04-02 | End: 2025-06-12

## 2025-06-12 RX ORDER — NITROFURANTOIN 25; 75 MG/1; MG/1
CAPSULE ORAL
COMMUNITY
Start: 2024-12-31

## 2025-06-12 RX ORDER — PROMETHAZINE HYDROCHLORIDE 50 MG/1
TABLET ORAL
COMMUNITY
Start: 2024-12-31 | End: 2025-06-12

## 2025-06-12 RX ORDER — TIZANIDINE 2 MG/1
TABLET ORAL
COMMUNITY
End: 2025-06-12

## 2025-06-12 RX ORDER — HYDROXYCHLOROQUINE SULFATE 200 MG/1
TABLET, FILM COATED ORAL
COMMUNITY
End: 2025-06-12

## 2025-06-12 RX ORDER — ESCITALOPRAM OXALATE 20 MG/1
TABLET ORAL
COMMUNITY
End: 2025-06-12

## 2025-06-12 RX ORDER — AMITRIPTYLINE HYDROCHLORIDE 10 MG/1
10 TABLET, FILM COATED ORAL
COMMUNITY
Start: 2025-02-05 | End: 2025-06-12

## 2025-06-12 RX ORDER — ESTRADIOL 10 UG/1
INSERT VAGINAL
Qty: 18 EACH | Refills: 0 | Status: SHIPPED | OUTPATIENT
Start: 2025-06-12

## 2025-06-12 RX ORDER — LEVOTHYROXINE SODIUM 200 UG/1
CAPSULE ORAL
COMMUNITY

## 2025-06-12 RX ORDER — ZOLPIDEM TARTRATE 12.5 MG/1
TABLET, COATED ORAL
COMMUNITY
End: 2025-06-12

## 2025-06-12 NOTE — PROGRESS NOTES
Subjective     Patient ID: Giovanan Arthur is a 49 y.o. female.    Chief Complaint:  Well Woman and Urinary Tract Infection (Pt reports burning, frequency, and abdominal pain)      History of Present Illness  Urinary Tract Infection   Associated symptoms include frequency and urgency. Pertinent negatives include no chills, flank pain, hematuria, nausea, vomiting, constipation or rash.     Annual Exam-Premenopausal  Patient presents for annual exam. The patient has complaints today. The patient is sexually active. GYN screening history: last pap: was normal and last mammogram: was normal.  Patient reports that she has a history of urinary tract infections since she had her bladder suspension and hysterectomy almost 20 years ago.  She had a hysterectomy because of a failed tubal ligation.  In December of 2024 she was diagnosed with a urinary tract infection that was positive for MDRO and had a PICC line for 2 weeks for antibiotics. (Meropenem). The PICC line was discontinued and 2 days later the infection returned.  Last Monday she began having urinary tract infection symptoms again..  She reports dysuria, frequency, urgency and feels like she is urinating razor blades.  Her primary care provider called out Levaquin and Macrobid.  She is also on Pyridium.  At this time she reports no relief.  She does report that she has tried vaginal estrogen in the past but does not feel like it was helpful.  She has had a vaginoplasty.  She denies vaginal discharge    Outpatient Medications Marked as Taking for the 6/12/25 encounter (Office Visit) with Carmina Armas NP   Medication Sig Dispense Refill    levoFLOXacin (LEVAQUIN) 750 MG tablet Take 750 mg by mouth.      levothyroxine (SYNTHROID) 200 MCG tablet Take 250 mcg by mouth before breakfast. Every other day      levothyroxine (SYNTHROID) 25 MCG tablet TAKE 1 TABLET BY MOUTH ONCE DAILY ALONG WITH 200 MCG TO MAKE 225 MCG      nitrofurantoin, macrocrystal-monohydrate, (MACROBID)  "100 MG capsule        Vitals:    25 0947   BP: 114/80   Pulse: 99   Weight: 76.7 kg (169 lb)   Height: 5' 9" (1.753 m)     Past Medical History:   Diagnosis Date    History of peptic ulcer disease     Hypothyroidism, unspecified     FARIHA (iron deficiency anemia)     Personal history of colonic polyps     Systemic lupus erythematosus, organ or system involvement unspecified      Past Surgical History:   Procedure Laterality Date    BACK SURGERY      CHOLECYSTECTOMY      COLONOSCOPY      EGD - EXTERNAL RESULT      EGD, WITH CLOSED BIOPSY  09/10/2024    GASTRIC BYPASS  2017    SALPINGECTOMY      SPINE SURGERY      THYROIDECTOMY      TOTAL VAGINAL HYSTERECTOMY      BS    TUBAL LIGATION      VAGINOPLASTY           GYN & OB History  No LMP recorded. Patient has had a hysterectomy.   Date of Last Pap: No result found    OB History    Para Term  AB Living   5 3 3  2 3   SAB IAB Ectopic Multiple Live Births   1    3      # Outcome Date GA Lbr Filemon/2nd Weight Sex Type Anes PTL Lv   5 Term      Vag-Spont   CONSTANTIN   4 Term      Vag-Spont   CONSTANTIN   3 AB            2 SAB            1 Term      Vag-Spont   CONSTANTIN       Review of Systems  Review of Systems   Constitutional:  Negative for activity change, appetite change, chills, fatigue and fever.   HENT:  Negative for nasal congestion and tinnitus.    Eyes:  Negative for visual disturbance.   Respiratory:  Negative for cough and shortness of breath.    Cardiovascular:  Negative for chest pain and palpitations.   Gastrointestinal:  Negative for abdominal pain, bloating, blood in stool, constipation, nausea and vomiting.   Endocrine: Negative for diabetes, hair loss and hot flashes.   Genitourinary:  Positive for dysuria, frequency and urgency. Negative for bladder incontinence, decreased libido, dysmenorrhea, dyspareunia, flank pain, genital sores, hematuria, hot flashes, menorrhagia, menstrual problem, pelvic pain, vaginal bleeding, vaginal discharge, vaginal pain, " urinary incontinence, postcoital bleeding, postmenopausal bleeding, vaginal dryness and vaginal odor.   Musculoskeletal:  Negative for arthralgias, back pain, leg pain and myalgias.   Integumentary:  Negative for rash, acne, hair changes, mole/lesion, breast mass, nipple discharge, breast skin changes and breast tenderness.   Neurological:  Negative for vertigo, syncope, numbness and headaches.   Hematological:  Does not bruise/bleed easily.   Psychiatric/Behavioral:  Negative for depression and sleep disturbance. The patient is not nervous/anxious.    Breast: Negative for asymmetry, lump, mass, mastodynia, nipple discharge, skin changes and tenderness         Objective   Physical Exam:   Constitutional: She is oriented to person, place, and time. She appears well-developed and well-nourished.    HENT:   Head: Normocephalic.   Nose: No epistaxis.      Cardiovascular:  Normal rate, regular rhythm and normal heart sounds.             Pulmonary/Chest: Effort normal and breath sounds normal. Right breast exhibits no inverted nipple, no mass, no nipple discharge, no skin change, no tenderness, no bleeding and no swelling. Left breast exhibits no inverted nipple, no mass, no nipple discharge, no skin change, no tenderness, no bleeding and no swelling.        Abdominal: Soft.     Genitourinary:    Inguinal canal, urethra, bladder, right adnexa, left adnexa and rectum normal.      Pelvic exam was performed with patient supine.   The external female genitalia was normal.     Labial bartholins normal.There is vaginal discharge (yellow) in the vagina. Vaginal atrophy noted. Cervix is absent.Uterus is absent. Normal urethral meatus.   Genitourinary Comments: Female chaperone present for exam               Musculoskeletal: Normal range of motion and moves all extremeties.       Neurological: She is alert and oriented to person, place, and time.    Skin: Skin is warm and dry.    Psychiatric: She has a normal mood and affect. Her  behavior is normal.            Assessment and Plan     1. Gynecologic exam normal    2. Chronic UTI    3. Vaginal leukorrhea             Plan:  Gynecologic exam normal  Patient was counseled today on A.C.S. Pap guidelines and recommendations for yearly pelvic exams, mammograms and monthly self breast exams; to see her PCP for other health maintenance.     Chronic UTI  -     Cancel: POCT urine dipstick without microscope  -     Urinalysis  -     Urine Culture High Risk  -     Ambulatory referral/consult to Urology; Future; Expected date: 06/19/2025  -     estradioL (IMVEXXY STARTER PACK) 10 mcg InPk; Place 1 capsule in the vagina once a day about the same time each day for 2 weeks.  After that insert 1 capsule vaginally twice a week.  There should be 3-4 days between each dose.  Dispense: 18 each; Refill: 0    Vaginal leukorrhea  -     Aerobic culture; Future; Expected date: 06/12/2025  -     Fungus culture; Future; Expected date: 06/12/2025  -     Vaginosis Screen by DNA Probe; Future; Expected date: 06/12/2025  -     Mycoplasma genitalium Molecular Detection, PCR Vagina; Future; Expected date: 06/12/2025  -     Ureaplasma PCR; Future; Expected date: 06/12/2025  Will treat swab    Vaginal dryness  -     estradioL (IMVEXXY STARTER PACK) 10 mcg InPk; Place 1 capsule in the vagina once a day about the same time each day for 2 weeks.  After that insert 1 capsule vaginally twice a week.  There should be 3-4 days between each dose.  Dispense: 18 each; Refill: 0  This can also help with chronic UTIs    Follow up in about 1 year (around 6/12/2026).

## 2025-06-14 LAB
AMORPH URATE CRY URNS QL MICRO: ABNORMAL
BACTERIA #/AREA URNS HPF: ABNORMAL /[HPF]
BILIRUB UR QL STRIP: ABNORMAL
CLARITY UR: ABNORMAL
COLOR UR: ABNORMAL
EPITHELIAL CELLS: ABNORMAL
GLUCOSE (UA): NEGATIVE MG/DL
KETONES UR QL STRIP: NEGATIVE MG/DL
LEUKOCYTE ESTERASE UR QL STRIP: ABNORMAL
MUCOUS THREADS URNS QL MICRO: NEGATIVE
NITRITE UR QL STRIP: POSITIVE
OCCULT BLOOD: ABNORMAL
PH, URINE: 6 (ref 5–7.5)
PROT UR QL STRIP: 75 MG/DL
RBC/HPF: ABNORMAL
SP GR UR STRIP: 1.01 (ref 1–1.03)
URINE CULTURE, ROUTINE: NORMAL
UROBILINOGEN, URINE: 12 E.U./DL (ref 0–1)
WBC/HPF: ABNORMAL

## 2025-06-16 ENCOUNTER — RESULTS FOLLOW-UP (OUTPATIENT)
Dept: OBSTETRICS AND GYNECOLOGY | Facility: CLINIC | Age: 50
End: 2025-06-16

## 2025-06-17 ENCOUNTER — RESULTS FOLLOW-UP (OUTPATIENT)
Dept: OBSTETRICS AND GYNECOLOGY | Facility: CLINIC | Age: 50
End: 2025-06-17

## 2025-06-17 DIAGNOSIS — N89.8 VAGINAL LEUKORRHEA: Primary | ICD-10-CM

## 2025-06-17 RX ORDER — TINIDAZOLE 500 MG/1
2 TABLET ORAL
Qty: 8 TABLET | Refills: 0 | Status: SHIPPED | OUTPATIENT
Start: 2025-06-17 | End: 2025-06-19

## 2025-09-03 ENCOUNTER — RESULTS FOLLOW-UP (OUTPATIENT)
Dept: GASTROENTEROLOGY | Facility: CLINIC | Age: 50
End: 2025-09-03
Payer: COMMERCIAL